# Patient Record
Sex: MALE | Race: WHITE | NOT HISPANIC OR LATINO | Employment: FULL TIME | URBAN - METROPOLITAN AREA
[De-identification: names, ages, dates, MRNs, and addresses within clinical notes are randomized per-mention and may not be internally consistent; named-entity substitution may affect disease eponyms.]

---

## 2022-05-10 ENCOUNTER — LAB (OUTPATIENT)
Dept: LAB | Facility: HOSPITAL | Age: 32
End: 2022-05-10

## 2022-05-10 ENCOUNTER — OFFICE VISIT (OUTPATIENT)
Dept: FAMILY MEDICINE CLINIC | Age: 32
End: 2022-05-10

## 2022-05-10 VITALS
SYSTOLIC BLOOD PRESSURE: 135 MMHG | BODY MASS INDEX: 20.84 KG/M2 | WEIGHT: 145.6 LBS | TEMPERATURE: 98 F | HEIGHT: 70 IN | DIASTOLIC BLOOD PRESSURE: 79 MMHG | HEART RATE: 75 BPM

## 2022-05-10 DIAGNOSIS — Z00.00 ROUTINE ADULT HEALTH MAINTENANCE: ICD-10-CM

## 2022-05-10 DIAGNOSIS — Z13.29 SCREENING FOR THYROID DISORDER: ICD-10-CM

## 2022-05-10 DIAGNOSIS — Z11.59 NEED FOR HEPATITIS C SCREENING TEST: ICD-10-CM

## 2022-05-10 DIAGNOSIS — Z23 NEED FOR VACCINATION: Primary | ICD-10-CM

## 2022-05-10 DIAGNOSIS — Z13.220 SCREENING FOR LIPID DISORDERS: ICD-10-CM

## 2022-05-10 DIAGNOSIS — Z00.00 ANNUAL PHYSICAL EXAM: ICD-10-CM

## 2022-05-10 LAB
ALBUMIN SERPL-MCNC: 4.7 G/DL (ref 3.5–5.2)
ALBUMIN/GLOB SERPL: 1.8 G/DL
ALP SERPL-CCNC: 75 U/L (ref 39–117)
ALT SERPL W P-5'-P-CCNC: 20 U/L (ref 1–41)
ANION GAP SERPL CALCULATED.3IONS-SCNC: 12.9 MMOL/L (ref 5–15)
AST SERPL-CCNC: 22 U/L (ref 1–40)
BASOPHILS # BLD AUTO: 0.04 10*3/MM3 (ref 0–0.2)
BASOPHILS NFR BLD AUTO: 0.6 % (ref 0–1.5)
BILIRUB SERPL-MCNC: 0.7 MG/DL (ref 0–1.2)
BUN SERPL-MCNC: 16 MG/DL (ref 6–20)
BUN/CREAT SERPL: 14.3 (ref 7–25)
CALCIUM SPEC-SCNC: 9.9 MG/DL (ref 8.6–10.5)
CHLORIDE SERPL-SCNC: 104 MMOL/L (ref 98–107)
CHOLEST SERPL-MCNC: 212 MG/DL (ref 0–200)
CO2 SERPL-SCNC: 22.1 MMOL/L (ref 22–29)
CREAT SERPL-MCNC: 1.12 MG/DL (ref 0.76–1.27)
DEPRECATED RDW RBC AUTO: 40.4 FL (ref 37–54)
EGFRCR SERPLBLD CKD-EPI 2021: 90.1 ML/MIN/1.73
EOSINOPHIL # BLD AUTO: 0.3 10*3/MM3 (ref 0–0.4)
EOSINOPHIL NFR BLD AUTO: 4.6 % (ref 0.3–6.2)
ERYTHROCYTE [DISTWIDTH] IN BLOOD BY AUTOMATED COUNT: 12.4 % (ref 12.3–15.4)
GLOBULIN UR ELPH-MCNC: 2.6 GM/DL
GLUCOSE SERPL-MCNC: 82 MG/DL (ref 65–99)
HCT VFR BLD AUTO: 43.8 % (ref 37.5–51)
HCV AB SER DONR QL: NORMAL
HDLC SERPL-MCNC: 42 MG/DL (ref 40–60)
HGB BLD-MCNC: 14.3 G/DL (ref 13–17.7)
IMM GRANULOCYTES # BLD AUTO: 0.01 10*3/MM3 (ref 0–0.05)
IMM GRANULOCYTES NFR BLD AUTO: 0.2 % (ref 0–0.5)
LDLC SERPL CALC-MCNC: 151 MG/DL (ref 0–100)
LDLC/HDLC SERPL: 3.54 {RATIO}
LYMPHOCYTES # BLD AUTO: 2.84 10*3/MM3 (ref 0.7–3.1)
LYMPHOCYTES NFR BLD AUTO: 43.6 % (ref 19.6–45.3)
MCH RBC QN AUTO: 29.1 PG (ref 26.6–33)
MCHC RBC AUTO-ENTMCNC: 32.6 G/DL (ref 31.5–35.7)
MCV RBC AUTO: 89 FL (ref 79–97)
MONOCYTES # BLD AUTO: 0.36 10*3/MM3 (ref 0.1–0.9)
MONOCYTES NFR BLD AUTO: 5.5 % (ref 5–12)
NEUTROPHILS NFR BLD AUTO: 2.97 10*3/MM3 (ref 1.7–7)
NEUTROPHILS NFR BLD AUTO: 45.5 % (ref 42.7–76)
PLATELET # BLD AUTO: 235 10*3/MM3 (ref 140–450)
PMV BLD AUTO: 8.8 FL (ref 6–12)
POTASSIUM SERPL-SCNC: 4 MMOL/L (ref 3.5–5.2)
PROT SERPL-MCNC: 7.3 G/DL (ref 6–8.5)
RBC # BLD AUTO: 4.92 10*6/MM3 (ref 4.14–5.8)
SODIUM SERPL-SCNC: 139 MMOL/L (ref 136–145)
TRIGL SERPL-MCNC: 107 MG/DL (ref 0–150)
TSH SERPL DL<=0.05 MIU/L-ACNC: 1.53 UIU/ML (ref 0.27–4.2)
VLDLC SERPL-MCNC: 19 MG/DL (ref 5–40)
WBC NRBC COR # BLD: 6.52 10*3/MM3 (ref 3.4–10.8)

## 2022-05-10 PROCEDURE — 36415 COLL VENOUS BLD VENIPUNCTURE: CPT

## 2022-05-10 PROCEDURE — 90471 IMMUNIZATION ADMIN: CPT | Performed by: NURSE PRACTITIONER

## 2022-05-10 PROCEDURE — 80050 GENERAL HEALTH PANEL: CPT

## 2022-05-10 PROCEDURE — 90715 TDAP VACCINE 7 YRS/> IM: CPT | Performed by: NURSE PRACTITIONER

## 2022-05-10 PROCEDURE — 80061 LIPID PANEL: CPT

## 2022-05-10 PROCEDURE — 99385 PREV VISIT NEW AGE 18-39: CPT | Performed by: NURSE PRACTITIONER

## 2022-05-10 PROCEDURE — 86803 HEPATITIS C AB TEST: CPT

## 2022-05-10 NOTE — PROGRESS NOTES
"Yves Cordova presents to [facility] with complaint of  Establish Care and Annual Exam    SUBJECTIVE  History of Present Illness     The patient presents today to establish relations. 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 He recently got health and dental insurance. He did not have prior PCP.     Only PMH includes anxiety. PSH includes hernia repair. He is not on any medications. His grandmother is diabetic.     He is interested in screening labs.     He is fully vaccinated for covid. He does not get annual flu vaccine.     He does not have any issues or concerns he wishes to address today.    OBJECTIVE  Vital Signs:   /79 (BP Location: Right arm, Patient Position: Sitting)   Pulse 75   Temp 98 °F (36.7 °C) (Oral)   Ht 177.8 cm (70\")   Wt 66 kg (145 lb 9.6 oz)   BMI 20.89 kg/m²       Physical Exam  Constitutional:       General: He is not in acute distress.     Appearance: Normal appearance. He is not ill-appearing.   HENT:      Head: Normocephalic and atraumatic.      Right Ear: Tympanic membrane and ear canal normal.      Left Ear: Tympanic membrane and ear canal normal.      Nose: Nose normal.      Mouth/Throat:      Mouth: Mucous membranes are moist.      Dentition: Dental caries present.   Neck:      Thyroid: No thyroid mass, thyromegaly or thyroid tenderness.   Cardiovascular:      Rate and Rhythm: Normal rate and regular rhythm.      Pulses: Normal pulses.      Heart sounds: Normal heart sounds.   Pulmonary:      Effort: Pulmonary effort is normal. No respiratory distress.      Breath sounds: Normal breath sounds.   Chest:      Chest wall: No tenderness.   Musculoskeletal:         General: Normal range of motion.      Cervical back: Normal range of motion and neck supple.   Lymphadenopathy:      Cervical: No cervical adenopathy.   Skin:     General: Skin is warm and dry.      Capillary Refill: Capillary refill takes less than 2 seconds.   Neurological:      General: " No focal deficit present.      Mental Status: He is alert and oriented to person, place, and time. Mental status is at baseline.   Psychiatric:         Mood and Affect: Mood normal.         Behavior: Behavior normal.              ASSESSMENT AND PLAN:  Diagnoses and all orders for this visit:    1. Need for vaccination (Primary)  -     Tdap Vaccine Greater Than or Equal To 6yo IM    2. Screening for thyroid disorder  -     TSH; Future    3. Screening for lipid disorders  -     Lipid Panel; Future    4. Annual physical exam  Comments:  -getting Tdap today, UTD on Covid, will start seeing dentist regularly     5. Routine adult health maintenance  -     CBC & Differential; Future  -     Comprehensive Metabolic Panel; Future    6. Need for hepatitis C screening test  -     Hepatitis C antibody; Future        Follow Up   Return in about 1 year (around 5/10/2023). Patient to notify office with any acute concerns or issues.  Patient verbalizes understanding, agrees with plan of care and has no further questions upon discharge.     Patient was given instructions and counseling regarding his condition or for health maintenance advice. Please see specific information pulled into the AVS if appropriate.

## 2022-12-05 ENCOUNTER — TELEMEDICINE (OUTPATIENT)
Dept: FAMILY MEDICINE CLINIC | Age: 32
End: 2022-12-05

## 2022-12-05 VITALS — HEIGHT: 70 IN | BODY MASS INDEX: 20.89 KG/M2

## 2022-12-05 DIAGNOSIS — R11.0 NAUSEA: ICD-10-CM

## 2022-12-05 DIAGNOSIS — U07.1 COVID: Primary | ICD-10-CM

## 2022-12-05 DIAGNOSIS — R05.1 ACUTE COUGH: ICD-10-CM

## 2022-12-05 PROCEDURE — 99213 OFFICE O/P EST LOW 20 MIN: CPT | Performed by: NURSE PRACTITIONER

## 2022-12-05 RX ORDER — GUAIFENESIN 600 MG/1
1200 TABLET, EXTENDED RELEASE ORAL 2 TIMES DAILY
Qty: 40 TABLET | Refills: 0 | Status: SHIPPED | OUTPATIENT
Start: 2022-12-05

## 2022-12-05 RX ORDER — ONDANSETRON 4 MG/1
4 TABLET, ORALLY DISINTEGRATING ORAL EVERY 8 HOURS PRN
Qty: 15 TABLET | Refills: 0 | Status: SHIPPED | OUTPATIENT
Start: 2022-12-05

## 2022-12-05 RX ORDER — DEXTROMETHORPHAN HYDROBROMIDE AND PROMETHAZINE HYDROCHLORIDE 15; 6.25 MG/5ML; MG/5ML
5 SYRUP ORAL NIGHTLY PRN
Qty: 118 ML | Refills: 0 | Status: SHIPPED | OUTPATIENT
Start: 2022-12-05

## 2022-12-05 NOTE — PROGRESS NOTES
Chief Complaint  Covid-19 Home Monitoring Video Visit ([phone] ), Cough (Sx started last friday), Diarrhea, Headache, Vomiting, and Generalized Body Aches    Subjective        Yves Cordova presents to [facility]  Cough  This is a new problem. The current episode started in the past 7 days. The cough is non-productive. Associated symptoms include headaches, nasal congestion and postnasal drip. Pertinent negatives include no chest pain, chills, fever or shortness of breath. He has tried OTC cough suppressant for the symptoms.   Patient here today for concerns of possible covid infection or URI     Known Exposure to positive case?   na  Date of exposure?   na  Date of symptoms start?   12/2/2022  (Symptoms may appear 2-14 days after exposure )    Fever or chills?   yes  Cough?   yes  Shortness of breath or difficulty breathing?  no  Fatigue?   yes  Muscle or body aches?  yes   Headache?   yes  New loss of taste or smell? no  Sore throat?  yes  Congestion or runny nose? yes  Nausea or vomiting?   yes  Diarrhea?   yes  Any  emergency warning signs for COVID-19.   Trouble breathing?  no  Persistent pain or pressure in the chest?   no  New confusion? no  Inability to wake or stay awake?no  Pale, gray, or blue-colored skin, lips, or nail beds, depending on skin tone?   no    Taking any medications at home to help with symptoms?yes  Any prior vaccine to covid?   yes  Any significant health problems / existing lung / heart problems?  no  Interested in monoclonal antibody treatment if test result is positive? no  (must be symptomatic, have a positive PCR covid test and meet ONE of the following criteria and be at least 18 YOA)  Age 65 or older  Obesity (BMI of 25 or more)  Pregnancy  Immunosuppressed  Diabetes  CKD  Cardiovascular disease, hypertension, chronic lung disease  Sickle cell disease   Neurodevelopment disorder    Patient consent to video visit.  Patient identity  "confirmed.  [employer] video call was used.  Provider in office at desktop  Patient at home.      Objective   Vital Signs:  Ht 177.8 cm (70\")   BMI 20.89 kg/m²   Estimated body mass index is 20.89 kg/m² as calculated from the following:    Height as of this encounter: 177.8 cm (70\").    Weight as of 5/10/22: 66 kg (145 lb 9.6 oz).    BMI is within normal parameters. No other follow-up for BMI required.      Physical Exam  HENT:      Head: Normocephalic.   Pulmonary:      Effort: No respiratory distress.   Neurological:      Mental Status: He is alert and oriented to person, place, and time.   Psychiatric:         Mood and Affect: Mood normal.        Result Review :                Assessment and Plan   Diagnoses and all orders for this visit:    1. COVID (Primary)  Comments:  Improving. Force fluids.     2. Acute cough  -     promethazine-dextromethorphan (PROMETHAZINE-DM) 6.25-15 MG/5ML syrup; Take 5 mL by mouth At Night As Needed for Cough.  Dispense: 118 mL; Refill: 0  -     guaiFENesin (Mucinex) 600 MG 12 hr tablet; Take 2 tablets by mouth 2 (Two) Times a Day.  Dispense: 40 tablet; Refill: 0    3. Nausea  -     ondansetron ODT (ZOFRAN-ODT) 4 MG disintegrating tablet; Place 1 tablet on the tongue Every 8 (Eight) Hours As Needed for Nausea or Vomiting.  Dispense: 15 tablet; Refill: 0             Follow Up   Return if symptoms worsen or fail to improve.  Patient was given instructions and counseling regarding his condition or for health maintenance advice. Please see specific information pulled into the AVS if appropriate.       "

## 2024-01-17 ENCOUNTER — LAB (OUTPATIENT)
Dept: LAB | Facility: HOSPITAL | Age: 34
End: 2024-01-17
Payer: COMMERCIAL

## 2024-01-17 ENCOUNTER — OFFICE VISIT (OUTPATIENT)
Dept: FAMILY MEDICINE CLINIC | Age: 34
End: 2024-01-17
Payer: COMMERCIAL

## 2024-01-17 VITALS
HEART RATE: 87 BPM | OXYGEN SATURATION: 98 % | DIASTOLIC BLOOD PRESSURE: 97 MMHG | HEIGHT: 70 IN | WEIGHT: 259.2 LBS | BODY MASS INDEX: 37.11 KG/M2 | SYSTOLIC BLOOD PRESSURE: 153 MMHG | TEMPERATURE: 98.2 F

## 2024-01-17 DIAGNOSIS — R11.0 NAUSEA: Primary | ICD-10-CM

## 2024-01-17 DIAGNOSIS — K29.00 ACUTE GASTRITIS WITHOUT HEMORRHAGE, UNSPECIFIED GASTRITIS TYPE: ICD-10-CM

## 2024-01-17 DIAGNOSIS — R11.0 NAUSEA: ICD-10-CM

## 2024-01-17 DIAGNOSIS — A04.8 H. PYLORI INFECTION: ICD-10-CM

## 2024-01-17 PROCEDURE — 99213 OFFICE O/P EST LOW 20 MIN: CPT | Performed by: NURSE PRACTITIONER

## 2024-01-17 PROCEDURE — 83013 H PYLORI (C-13) BREATH: CPT

## 2024-01-17 RX ORDER — OMEPRAZOLE 40 MG/1
40 CAPSULE, DELAYED RELEASE ORAL DAILY
Qty: 14 CAPSULE | Refills: 0 | Status: SHIPPED | OUTPATIENT
Start: 2024-01-17 | End: 2024-01-22 | Stop reason: SDUPTHER

## 2024-01-17 RX ORDER — ONDANSETRON 4 MG/1
4 TABLET, ORALLY DISINTEGRATING ORAL EVERY 8 HOURS PRN
Qty: 30 TABLET | Refills: 0 | Status: SHIPPED | OUTPATIENT
Start: 2024-01-17

## 2024-01-17 RX ORDER — SUCRALFATE 1 G/1
1 TABLET ORAL 3 TIMES DAILY
Qty: 40 TABLET | Refills: 1 | Status: SHIPPED | OUTPATIENT
Start: 2024-01-17

## 2024-01-17 NOTE — PROGRESS NOTES
"Chief Complaint  Nausea (Sx started 12-30), Diarrhea (After eating - yellow ), Vomiting, and Abdominal Pain    Subjective    Patient is in today with complaints of continued nausea.  Patient reports that he had the stomach virus on New Year's, and since that time he has been feeling nauseated in the morning, and nausea after eating. Stool is yellow, normal consistency.  Reports reflux since childhood with no recent increase. Denies abdominal pain, fever, chills, or diarrhea at this time.         Yves Cordova presents to [facility]  History of Present Illness    Objective   Vital Signs:  /97 (BP Location: Left arm, Patient Position: Sitting, Cuff Size: Adult)   Pulse 87   Temp 98.2 °F (36.8 °C) (Temporal)   Ht 177.8 cm (70\")   Wt 118 kg (259 lb 3.2 oz)   SpO2 98% Comment: room air  BMI 37.19 kg/m²   Estimated body mass index is 37.19 kg/m² as calculated from the following:    Height as of this encounter: 177.8 cm (70\").    Weight as of this encounter: 118 kg (259 lb 3.2 oz).             Physical Exam  HENT:      Head: Normocephalic.   Cardiovascular:      Rate and Rhythm: Normal rate and regular rhythm.   Pulmonary:      Effort: Pulmonary effort is normal. No respiratory distress.      Breath sounds: Normal breath sounds. No stridor. No wheezing, rhonchi or rales.   Abdominal:      General: Bowel sounds are normal. There is no distension.      Palpations: Abdomen is soft.      Tenderness: There is no abdominal tenderness. There is no guarding.   Skin:     General: Skin is warm and dry.   Neurological:      Mental Status: He is alert and oriented to person, place, and time.   Psychiatric:         Mood and Affect: Mood normal.        Result Review :                     Assessment and Plan     Diagnoses and all orders for this visit:    1. Nausea (Primary)  -     US Gallbladder; Future  -     H. Pylori Breath Test - Breath, Lung; Future  -     ondansetron ODT (ZOFRAN-ODT) " 4 MG disintegrating tablet; Place 1 tablet on the tongue Every 8 (Eight) Hours As Needed for Nausea or Vomiting.  Dispense: 30 tablet; Refill: 0    2. Acute gastritis without hemorrhage, unspecified gastritis type  -     sucralfate (Carafate) 1 g tablet; Take 1 tablet by mouth 3 (Three) Times a Day.  Dispense: 40 tablet; Refill: 1  -     omeprazole (priLOSEC) 40 MG capsule; Take 1 capsule by mouth Daily.  Dispense: 14 capsule; Refill: 0    [place] , low fat diet until test performed. Will treat with PPI and Carafate for possible gastritis/irritation of lining of stomach. Zofran for PRN use. ER if unable to hold fluids down for 48 hours.          Follow Up     Return if symptoms worsen or fail to improve.  Patient was given instructions and counseling regarding his condition or for health maintenance advice. Please see specific information pulled into the AVS if appropriate.

## 2024-01-20 LAB — UREA BREATH TEST QL: POSITIVE

## 2024-01-22 RX ORDER — OMEPRAZOLE 20 MG/1
20 CAPSULE, DELAYED RELEASE ORAL 2 TIMES DAILY
Qty: 28 CAPSULE | Refills: 0 | Status: SHIPPED | OUTPATIENT
Start: 2024-01-22 | End: 2024-02-05

## 2024-01-22 RX ORDER — CLARITHROMYCIN 500 MG/1
500 TABLET, COATED ORAL 2 TIMES DAILY
Qty: 28 TABLET | Refills: 0 | Status: SHIPPED | OUTPATIENT
Start: 2024-01-22 | End: 2024-02-05

## 2024-01-22 RX ORDER — METRONIDAZOLE 500 MG/1
500 TABLET ORAL 3 TIMES DAILY
Qty: 42 TABLET | Refills: 0 | Status: SHIPPED | OUTPATIENT
Start: 2024-01-22 | End: 2024-02-05

## 2024-01-23 ENCOUNTER — HOSPITAL ENCOUNTER (OUTPATIENT)
Dept: ULTRASOUND IMAGING | Facility: HOSPITAL | Age: 34
Discharge: HOME | End: 2024-01-23
Admitting: NURSE PRACTITIONER
Payer: COMMERCIAL

## 2024-01-23 DIAGNOSIS — R11.0 NAUSEA: ICD-10-CM

## 2024-01-23 PROCEDURE — 76705 ECHO EXAM OF ABDOMEN: CPT

## 2024-06-05 ENCOUNTER — OFFICE VISIT (OUTPATIENT)
Dept: FAMILY MEDICINE CLINIC | Age: 34
End: 2024-06-05
Payer: COMMERCIAL

## 2024-06-05 VITALS
HEART RATE: 66 BPM | HEIGHT: 70 IN | TEMPERATURE: 97.8 F | WEIGHT: 262.2 LBS | DIASTOLIC BLOOD PRESSURE: 82 MMHG | BODY MASS INDEX: 37.54 KG/M2 | SYSTOLIC BLOOD PRESSURE: 118 MMHG | OXYGEN SATURATION: 97 %

## 2024-06-05 DIAGNOSIS — K21.9 GASTROESOPHAGEAL REFLUX DISEASE, UNSPECIFIED WHETHER ESOPHAGITIS PRESENT: Primary | ICD-10-CM

## 2024-06-05 PROBLEM — Z86.19 HISTORY OF HELICOBACTER PYLORI INFECTION: Status: ACTIVE | Noted: 2024-06-05

## 2024-06-05 PROCEDURE — 99214 OFFICE O/P EST MOD 30 MIN: CPT | Performed by: NURSE PRACTITIONER

## 2024-06-05 RX ORDER — FAMOTIDINE 40 MG/1
40 TABLET, FILM COATED ORAL DAILY
Qty: 90 TABLET | Refills: 0 | Status: SHIPPED | OUTPATIENT
Start: 2024-06-05

## 2024-06-05 NOTE — ASSESSMENT & PLAN NOTE
Will start daily famotidine. Continue with lifestyle modifications. Discussed consideration for possible scope if symptoms persist or worsen. Work note provided to patient.

## 2024-06-05 NOTE — PROGRESS NOTES
Chief Complaint  Yves Cordova presents to [facility] for Heartburn (Acid reflux getting worse)    Subjective          History of Present Illness    Yves is here today with c/o acid reflux. He reports that he has had symptoms since he was around age 13. H pylori positive 1/17/24. Treatment completed and other symptoms improved. He has had increased burning in esophageal region remaining. He is taking Tums on occasion. He has changed his diet and avoids eating close to bedtime. He called in to work this morning after throwing up. Needs work note.    Review of Systems      Allergies   Allergen Reactions    Penicillins Hives      Past Medical History:   Diagnosis Date    Anxiety      Current Outpatient Medications   Medication Sig Dispense Refill    famotidine (Pepcid) 40 MG tablet Take 1 tablet by mouth Daily. 90 tablet 0     No current facility-administered medications for this visit.     Past Surgical History:   Procedure Laterality Date    HERNIA REPAIR        Social History     Tobacco Use    Smoking status: Former     Current packs/day: 0.00     Average packs/day: 1 pack/day for 12.0 years (12.0 ttl pk-yrs)     Types: Cigarettes     Start date: 5/10/2009     Quit date: 5/10/2021     Years since quitting: 3.0     Passive exposure: Past    Smokeless tobacco: Never   Vaping Use    Vaping status: Never Used   Substance Use Topics    Alcohol use: Never    Drug use: Never     Family History   Problem Relation Age of Onset    Diabetes Maternal Grandmother      Health Maintenance Due   Topic Date Due    ANNUAL PHYSICAL  05/10/2023      Immunization History   Administered Date(s) Administered    COVID-19 (MODERNA) 1st,2nd,3rd Dose Monovalent 04/13/2021, 05/11/2021, 12/13/2021    Hep B, Adolescent or Pediatric 11/12/2002, 12/12/2002, 03/09/2004    MMR 11/12/2002    Td (TDVAX) 03/09/2004    Tdap 05/10/2022        Objective     Vitals:    06/05/24 1027   BP: 118/82   BP Location: Left arm  "  Patient Position: Sitting   Cuff Size: Large Adult   Pulse: 66   Temp: 97.8 °F (36.6 °C)   TempSrc: Oral   SpO2: 97%   Weight: 119 kg (262 lb 3.2 oz)   Height: 177.8 cm (70\")     Body mass index is 37.62 kg/m².   Class 2 Severe Obesity (BMI >=35 and <=39.9). Obesity-related health conditions include the following: GERD. Obesity is unchanged. BMI is is above average; BMI management plan is completed. We discussed Information on healthy weight added to patient's after visit summary.            No results found.    Physical Exam  Vitals reviewed.   Constitutional:       General: He is not in acute distress.     Appearance: Normal appearance. He is well-developed.   HENT:      Head: Normocephalic and atraumatic.   Cardiovascular:      Rate and Rhythm: Normal rate.   Pulmonary:      Effort: Pulmonary effort is normal.   Neurological:      Mental Status: He is alert and oriented to person, place, and time.   Psychiatric:         Mood and Affect: Mood and affect normal.           Result Review :                               Assessment and Plan      Assessment & Plan  Gastroesophageal reflux disease, unspecified whether esophagitis present  Will start daily famotidine. Continue with lifestyle modifications. Discussed consideration for possible scope if symptoms persist or worsen. Work note provided to patient.      New Medications Ordered This Visit   Medications    famotidine (Pepcid) 40 MG tablet     Sig: Take 1 tablet by mouth Daily.     Dispense:  90 tablet     Refill:  0                 Follow Up     No follow-ups on file.             "

## 2024-06-05 NOTE — LETTER
June 5, 2024     Patient: Yves Cordova   YOB: 1990   Date of Visit: 6/5/2024       To Whom It May Concern:    It is my medical opinion that Yves Cordova  be excused from work on Wednesday June 5, 2024 .           Sincerely,        HAROON Ramos    CC: No Recipients

## 2024-07-23 ENCOUNTER — LAB (OUTPATIENT)
Dept: LAB | Facility: HOSPITAL | Age: 34
End: 2024-07-23
Payer: COMMERCIAL

## 2024-07-23 ENCOUNTER — OFFICE VISIT (OUTPATIENT)
Dept: FAMILY MEDICINE CLINIC | Age: 34
End: 2024-07-23
Payer: COMMERCIAL

## 2024-07-23 VITALS
OXYGEN SATURATION: 96 % | TEMPERATURE: 98.1 F | BODY MASS INDEX: 36.36 KG/M2 | SYSTOLIC BLOOD PRESSURE: 141 MMHG | WEIGHT: 254 LBS | HEIGHT: 70 IN | DIASTOLIC BLOOD PRESSURE: 95 MMHG | HEART RATE: 59 BPM

## 2024-07-23 DIAGNOSIS — R10.84 GENERALIZED ABDOMINAL PAIN: ICD-10-CM

## 2024-07-23 DIAGNOSIS — A04.8 H. PYLORI INFECTION: ICD-10-CM

## 2024-07-23 DIAGNOSIS — Z13.29 SCREENING FOR THYROID DISORDER: ICD-10-CM

## 2024-07-23 DIAGNOSIS — K21.9 GASTROESOPHAGEAL REFLUX DISEASE, UNSPECIFIED WHETHER ESOPHAGITIS PRESENT: ICD-10-CM

## 2024-07-23 DIAGNOSIS — R14.2 BELCHING: Primary | ICD-10-CM

## 2024-07-23 DIAGNOSIS — R14.2 BELCHING: ICD-10-CM

## 2024-07-23 LAB
ALBUMIN SERPL-MCNC: 4.6 G/DL (ref 3.5–5.2)
ALBUMIN/GLOB SERPL: 1.7 G/DL
ALP SERPL-CCNC: 71 U/L (ref 39–117)
ALT SERPL W P-5'-P-CCNC: 23 U/L (ref 1–41)
ANION GAP SERPL CALCULATED.3IONS-SCNC: 13 MMOL/L (ref 5–15)
AST SERPL-CCNC: 18 U/L (ref 1–40)
BASOPHILS # BLD AUTO: 0.04 10*3/MM3 (ref 0–0.2)
BASOPHILS NFR BLD AUTO: 0.5 % (ref 0–1.5)
BILIRUB SERPL-MCNC: 0.6 MG/DL (ref 0–1.2)
BUN SERPL-MCNC: 14 MG/DL (ref 6–20)
BUN/CREAT SERPL: 11.6 (ref 7–25)
CALCIUM SPEC-SCNC: 10 MG/DL (ref 8.6–10.5)
CHLORIDE SERPL-SCNC: 104 MMOL/L (ref 98–107)
CO2 SERPL-SCNC: 20 MMOL/L (ref 22–29)
CREAT SERPL-MCNC: 1.21 MG/DL (ref 0.76–1.27)
DEPRECATED RDW RBC AUTO: 40 FL (ref 37–54)
EGFRCR SERPLBLD CKD-EPI 2021: 80.6 ML/MIN/1.73
EOSINOPHIL # BLD AUTO: 0.32 10*3/MM3 (ref 0–0.4)
EOSINOPHIL NFR BLD AUTO: 4.1 % (ref 0.3–6.2)
ERYTHROCYTE [DISTWIDTH] IN BLOOD BY AUTOMATED COUNT: 12.1 % (ref 12.3–15.4)
GLOBULIN UR ELPH-MCNC: 2.7 GM/DL
GLUCOSE SERPL-MCNC: 96 MG/DL (ref 65–99)
HCT VFR BLD AUTO: 50.1 % (ref 37.5–51)
HGB BLD-MCNC: 16.6 G/DL (ref 13–17.7)
IMM GRANULOCYTES # BLD AUTO: 0.03 10*3/MM3 (ref 0–0.05)
IMM GRANULOCYTES NFR BLD AUTO: 0.4 % (ref 0–0.5)
LYMPHOCYTES # BLD AUTO: 3.1 10*3/MM3 (ref 0.7–3.1)
LYMPHOCYTES NFR BLD AUTO: 40 % (ref 19.6–45.3)
MCH RBC QN AUTO: 29.4 PG (ref 26.6–33)
MCHC RBC AUTO-ENTMCNC: 33.1 G/DL (ref 31.5–35.7)
MCV RBC AUTO: 88.8 FL (ref 79–97)
MONOCYTES # BLD AUTO: 0.42 10*3/MM3 (ref 0.1–0.9)
MONOCYTES NFR BLD AUTO: 5.4 % (ref 5–12)
NEUTROPHILS NFR BLD AUTO: 3.84 10*3/MM3 (ref 1.7–7)
NEUTROPHILS NFR BLD AUTO: 49.6 % (ref 42.7–76)
PLATELET # BLD AUTO: 279 10*3/MM3 (ref 140–450)
PMV BLD AUTO: 9.1 FL (ref 6–12)
POTASSIUM SERPL-SCNC: 4 MMOL/L (ref 3.5–5.2)
PROT SERPL-MCNC: 7.3 G/DL (ref 6–8.5)
RBC # BLD AUTO: 5.64 10*6/MM3 (ref 4.14–5.8)
SODIUM SERPL-SCNC: 137 MMOL/L (ref 136–145)
TSH SERPL DL<=0.05 MIU/L-ACNC: 1.32 UIU/ML (ref 0.27–4.2)
WBC NRBC COR # BLD AUTO: 7.75 10*3/MM3 (ref 3.4–10.8)

## 2024-07-23 PROCEDURE — 83013 H PYLORI (C-13) BREATH: CPT

## 2024-07-23 PROCEDURE — 99213 OFFICE O/P EST LOW 20 MIN: CPT | Performed by: NURSE PRACTITIONER

## 2024-07-23 PROCEDURE — 80050 GENERAL HEALTH PANEL: CPT

## 2024-07-23 PROCEDURE — 36415 COLL VENOUS BLD VENIPUNCTURE: CPT

## 2024-07-23 NOTE — LETTER
July 23, 2024     Patient: Yves Cordova   YOB: 1990   Date of Visit: 7/23/2024       To Whom It May Concern:    Yves Cordova  was seen in office today 7/23/24 .           Sincerely,        HAORON Renner

## 2024-07-23 NOTE — Clinical Note
Can we call [facility] and have them cancel the bismuth that is bid? It should be the qid, sorry about this!

## 2024-07-23 NOTE — PROGRESS NOTES
"Yves Cordova presents to [facility] with complaint of  GI Problem (Bile in bowel movement, nausea, waking up in the middle of the night with sweating and feeling hot. Pt states he had these same sx when he had H.pylori in January )    SUBJECTIVE  GI Problem  The primary symptoms include abdominal pain (generalized, achy), nausea, vomiting (wakes up in AM vomting bile almost every AM) and diarrhea (3 episodes per week). Primary symptoms do not include fever, weight loss, fatigue, melena, hematemesis, jaundice, hematochezia, dysuria, myalgias, arthralgias or rash. The illness began more than 7 days ago. The onset was gradual. The problem has not changed since onset.  The illness does not include chills, dysphagia, bloating, constipation or back pain. Significant associated medical issues include GERD. Associated medical issues comments: treated for h pylori 6 months ago, did not have retest to confirm resolution.     Saw Meghana Nettles here last month, started on pepcid 40 mg has seen some improvement.     OBJECTIVE  Vital Signs:   /95 (BP Location: Right arm, Patient Position: Sitting)   Pulse 59   Temp 98.1 °F (36.7 °C) (Oral)   Ht 177.8 cm (70\")   Wt 115 kg (254 lb)   SpO2 96% Comment: room air  BMI 36.45 kg/m²       Physical Exam  Constitutional:       General: He is not in acute distress.     Appearance: Normal appearance. He is not ill-appearing.   HENT:      Head: Normocephalic and atraumatic.      Nose: Nose normal.      Mouth/Throat:      Pharynx: Oropharynx is clear.   Cardiovascular:      Rate and Rhythm: Normal rate and regular rhythm.      Pulses: Normal pulses.      Heart sounds: Normal heart sounds.   Pulmonary:      Effort: Pulmonary effort is normal. No respiratory distress.      Breath sounds: Normal breath sounds.   Chest:      Chest wall: No tenderness.   Abdominal:      General: Bowel sounds are normal.      Palpations: Abdomen is soft.      " Tenderness: There is no abdominal tenderness.   Musculoskeletal:         General: Normal range of motion.      Cervical back: Normal range of motion and neck supple.   Skin:     General: Skin is warm and dry.   Neurological:      General: No focal deficit present.      Mental Status: He is alert and oriented to person, place, and time. Mental status is at baseline.   Psychiatric:         Mood and Affect: Mood normal.         Behavior: Behavior normal.          Results Review:  The following data was reviewed by HAROON Renner [unfilled] 11:55 EDT.  Office Visit with Meghana Nettles APRN (06/05/2024)   Office Visit with Ria Hameed APRN (01/17/2024)   H. Pylori Breath Test - Breath, Lung (01/17/2024 10:57)   US Gallbladder (01/23/2024 14:10)     ASSESSMENT AND PLAN:  Diagnoses and all orders for this visit:    1. Belching (Primary)  -     H. Pylori Breath Test - Breath, Lung; Future    2. Screening for thyroid disorder  -     TSH Rfx On Abnormal To Free T4; Future    3. Generalized abdominal pain  -     Comprehensive Metabolic Panel; Future  -     CBC & Differential; Future  -     TSH Rfx On Abnormal To Free T4; Future    4. Gastroesophageal reflux disease, unspecified whether esophagitis present      Will test for H. pylori, checking TSH, CMP, CBC.  Considered doing stool studies but he is not having diarrhea regularly.  Further workup and management depending on labs ordered today and patient progression of symptoms.      Follow Up   No follow-ups on file. Patient to notify office with any acute concerns or issues.  Patient verbalizes understanding, agrees with plan of care and has no further questions upon discharge.     Patient was given instructions and counseling regarding his condition or for health maintenance advice. Please see specific information pulled into the AVS if appropriate.     Discussed the importance of following up with any needed screening tests/labs/specialist appointments and any  requested follow-up recommended by me today. Importance of maintaining follow-up discussed and patient accepts that missed appointments can delay diagnosis and potentially lead to worsening of conditions.    Part of this note may be an electronic transcription/translation of spoken language to printed text using the Dragon Dictation System.

## 2024-07-25 LAB — UREA BREATH TEST QL: POSITIVE

## 2024-07-26 ENCOUNTER — TELEPHONE (OUTPATIENT)
Dept: FAMILY MEDICINE CLINIC | Age: 34
End: 2024-07-26
Payer: COMMERCIAL

## 2024-07-26 RX ORDER — METRONIDAZOLE 500 MG/1
500 TABLET ORAL 3 TIMES DAILY
Qty: 42 TABLET | Refills: 0 | Status: SHIPPED | OUTPATIENT
Start: 2024-07-26 | End: 2024-08-09

## 2024-07-26 RX ORDER — TETRACYCLINE HYDROCHLORIDE 500 MG/1
500 CAPSULE ORAL 4 TIMES DAILY
Qty: 56 CAPSULE | Refills: 0 | Status: SHIPPED | OUTPATIENT
Start: 2024-07-26 | End: 2024-08-09

## 2024-07-26 RX ORDER — PANTOPRAZOLE SODIUM 40 MG/1
40 TABLET, DELAYED RELEASE ORAL 2 TIMES DAILY
Qty: 28 TABLET | Refills: 0 | Status: SHIPPED | OUTPATIENT
Start: 2024-07-26 | End: 2024-08-09

## 2024-07-26 NOTE — TELEPHONE ENCOUNTER
----- Message from Carmen Lugo sent at 7/26/2024  8:28 AM EDT -----  Can we call [facility] and have them cancel the bismuth that is bid? It should be the qid, sorry about this!

## 2024-08-01 ENCOUNTER — OFFICE VISIT (OUTPATIENT)
Dept: FAMILY MEDICINE CLINIC | Age: 34
End: 2024-08-01
Payer: COMMERCIAL

## 2024-08-01 ENCOUNTER — TELEPHONE (OUTPATIENT)
Dept: FAMILY MEDICINE CLINIC | Age: 34
End: 2024-08-01

## 2024-08-01 VITALS
BODY MASS INDEX: 35.9 KG/M2 | SYSTOLIC BLOOD PRESSURE: 138 MMHG | WEIGHT: 250.8 LBS | DIASTOLIC BLOOD PRESSURE: 90 MMHG | HEART RATE: 61 BPM | TEMPERATURE: 97.7 F | HEIGHT: 70 IN

## 2024-08-01 DIAGNOSIS — A04.8 H. PYLORI INFECTION: Primary | ICD-10-CM

## 2024-08-01 DIAGNOSIS — R11.14 BILIOUS VOMITING WITH NAUSEA: ICD-10-CM

## 2024-08-01 DIAGNOSIS — Z91.89 AT RISK FOR DEHYDRATION: ICD-10-CM

## 2024-08-01 PROCEDURE — 99213 OFFICE O/P EST LOW 20 MIN: CPT | Performed by: NURSE PRACTITIONER

## 2024-08-01 NOTE — TELEPHONE ENCOUNTER
Patient is requesting STD forms to be completed for the following dates 07/30/2024-08/13/2024. Patient has paid $20 co-pay on 08/01/2024 for his visit with Carmen PATIÑO. Incomplete forms have been scanned in and attached to this encounter and placed forms in [place] mail box to be completed.

## 2024-08-01 NOTE — LETTER
August 1, 2024     Patient: Yves Cordova   YOB: 1990   Date of Visit: 8/1/2024       To Whom It May Concern:    It is my medical opinion that Yves Cordova be excused from work 07/30/2024 thru 08/13/2024. He may return 08/14/2024.    Sincerely,        HAROON Renner    CC: No Recipients

## 2024-08-01 NOTE — PROGRESS NOTES
"Yves Cordova presents to [facility] with complaint of  Medication Problem (He said he need FMLA because the medication is making him vomit. He hasn't ate since Sunday, he can't keep the meds down.)    SUBJECTIVE  History of Present Illness    Patient is here requesting FMLA forms to be filled out.  Patient was seen in the office July 23 and was found to have H. pylori infection.  This is the second time this year he has had H. pylori.  He was started on quadruple therapy.  Patient says that the medication regimen is making him very nauseated.  He has vomited 1 time, only vomited up 1 pill he says.  Patient says that he is vomiting mostly bile.  He is able to keep down very bland foods and Pedialyte and water.  Denies any severe abdominal pain, fever, or significant signs of dehydration. He is present with his mom today.       OBJECTIVE  Vital Signs:   /90   Pulse 61   Temp 97.7 °F (36.5 °C) (Oral)   Ht 177.8 cm (70\")   Wt 114 kg (250 lb 12.8 oz)   BMI 35.99 kg/m²       Physical Exam  Constitutional:       General: He is not in acute distress.     Appearance: Normal appearance. He is not ill-appearing.   HENT:      Head: Normocephalic and atraumatic.      Nose: Nose normal.      Mouth/Throat:      Pharynx: Oropharynx is clear.   Cardiovascular:      Rate and Rhythm: Normal rate and regular rhythm.      Pulses: Normal pulses.      Heart sounds: Normal heart sounds.   Pulmonary:      Effort: Pulmonary effort is normal. No respiratory distress.      Breath sounds: Normal breath sounds.   Chest:      Chest wall: No tenderness.   Musculoskeletal:         General: Normal range of motion.      Cervical back: Normal range of motion and neck supple.   Skin:     General: Skin is warm and dry.   Neurological:      General: No focal deficit present.      Mental Status: He is alert and oriented to person, place, and time. Mental status is at baseline.   Psychiatric:         Mood and " Affect: Mood normal.         Behavior: Behavior normal.              ASSESSMENT AND PLAN:  Diagnoses and all orders for this visit:    1. H. pylori infection (Primary)    2. At risk for dehydration    3. Bilious vomiting with nausea      Patient may have FMLA for the duration of his H. pylori quadruple treatment which is 2 weeks total.  Work note was provided we will fill out FMLA for him.  Discussed severe signs and symptoms of dehydration and that he should proceed to the ER should these occur.  Discussed importance of completing medication regimen to hopefully eradicate H. pylori infection.  He has lab order to have H. pylori retest 6 weeks after treatment, he understands that he should not use any PPI 1 week before treatment.  If it retest he is still positive, would recommend referral to infectious disease and /or GI for EGD.      Follow Up   No follow-ups on file. Patient to notify office with any acute concerns or issues.  Patient verbalizes understanding, agrees with plan of care and has no further questions upon discharge.     Patient was given instructions and counseling regarding his condition or for health maintenance advice. Please see specific information pulled into the AVS if appropriate.     Discussed the importance of following up with any needed screening tests/labs/specialist appointments and any requested follow-up recommended by me today. Importance of maintaining follow-up discussed and patient accepts that missed appointments can delay diagnosis and potentially lead to worsening of conditions.    Part of this note may be an electronic transcription/translation of spoken language to printed text using the Dragon Dictation System.

## 2024-08-12 ENCOUNTER — TELEPHONE (OUTPATIENT)
Dept: FAMILY MEDICINE CLINIC | Age: 34
End: 2024-08-12
Payer: COMMERCIAL

## 2024-08-12 NOTE — TELEPHONE ENCOUNTER
Caller: Yves Cordova    Relationship: Self    Best call back number: [phone]    What form or medical record are you requesting: [employer] FOR [employer] THAT WAS FAXED TO OFFICE TO BE FILLED OUT AND SENT BACK TO HR DEPARTMENT     Who is requesting this form or medical record from you: PATIENT'S HR DEPARTMENT     How would you like to receive the form or medical records (pick-up, mail, fax): FAX  If fax, what is the fax number: [phone]  ATTN: PABLO     Timeframe paperwork needed: TODAY, 08/12/2024    Additional notes: PATIENT'S HR FAXED OVER FITNESS FOR DUTY PAPER THAT NEEDS TO BE FILLED OUT TODAY, 08/12/2024, IN ORDER FOR PATIENT TO RETURN TO WORK WEDNESDAY, 08/14/2024. PATIENT WOULD LIKE CALL TO LET HIM KNOW WHEN THIS HAS BEEN COMPLETED.

## 2024-09-04 ENCOUNTER — LAB (OUTPATIENT)
Dept: LAB | Facility: HOSPITAL | Age: 34
End: 2024-09-04
Payer: COMMERCIAL

## 2024-09-04 DIAGNOSIS — A04.8 H. PYLORI INFECTION: ICD-10-CM

## 2024-09-04 PROCEDURE — 83013 H PYLORI (C-13) BREATH: CPT

## 2024-09-05 LAB — UREA BREATH TEST QL: NEGATIVE

## 2024-09-11 ENCOUNTER — TELEMEDICINE (OUTPATIENT)
Dept: FAMILY MEDICINE CLINIC | Facility: TELEHEALTH | Age: 34
End: 2024-09-11
Payer: COMMERCIAL

## 2024-09-11 DIAGNOSIS — K52.9 GASTROENTERITIS: Primary | ICD-10-CM

## 2024-09-11 NOTE — PROGRESS NOTES
You have chosen to receive care through a telehealth visit.  Do you consent to use a video/audio connection for your medical care today? Yes     CHIEF COMPLAINT  No chief complaint on file.        HPI  Yves Cordova is a 34 y.o. male  presents with complaint of had nausea last night, woke up this morning with vomiting and diarrhea.  Denies fever, abdominal pain.  Has zofran at home.     Review of Systems  See HPI    Past Medical History:   Diagnosis Date    Anxiety        Family History   Problem Relation Age of Onset    Diabetes Maternal Grandmother        Social History     Socioeconomic History    Marital status: Single   Tobacco Use    Smoking status: Former     Current packs/day: 0.00     Average packs/day: 1 pack/day for 12.0 years (12.0 ttl pk-yrs)     Types: Cigarettes     Start date: 5/10/2009     Quit date: 5/10/2021     Years since quitting: 3.3     Passive exposure: Past    Smokeless tobacco: Never   Vaping Use    Vaping status: Never Used   Substance and Sexual Activity    Alcohol use: Never    Drug use: Never       Yves Cordova  reports that he quit smoking about 3 years ago. His smoking use included cigarettes. He started smoking about 15 years ago. He has a 12 pack-year smoking history. He has been exposed to tobacco smoke. He has never used smokeless tobacco.               There were no vitals taken for this visit.    PHYSICAL EXAM  Physical Exam   Constitutional: He is oriented to person, place, and time. He appears well-developed and well-nourished. He does not have a sickly appearance. He does not appear ill.   HENT:   Head: Normocephalic and atraumatic.   Pulmonary/Chest: Effort normal.  No respiratory distress.  Neurological: He is alert and oriented to person, place, and time.       Results for orders placed or performed in visit on 09/04/24   H. Pylori Breath Test - Breath, Lung    Specimen: Lung; Breath   Result Value Ref Range    H. pylori Breath Test Negative Negative       Diagnoses  and all orders for this visit:    1. Gastroenteritis (Primary)    --take zofran if needed for n/v  --increase fluid intake  --f/u in 1-2 days if no improvement      FOLLOW-UP  As discussed during visit with PCP/[facility] Care if no improvement or Urgent Care/Emergency Department if worsening of symptoms    Patient verbalizes understanding of medication dosage, comfort measures, instructions for treatment and follow-up.    Kasey Tierney, APRN  09/11/2024  09:16 EDT    The use of a video visit has been reviewed with the patient and verbal informed consent has been obtained. Myself and Yves Cordova participated in this visit. The patient is located in [address] [place] [address].    I am located in [place], [place]. [employer] and [employer] were utilized. I spent 7 minutes in the patient's chart for this visit.      Note Disclaimer: At [facility], we believe that sharing information builds trust and better   relationships. You are receiving this note because you recently visited [facility]. It is possible you   will see health information before a provider has talked with you about it. This kind of information can   be easy to misunderstand. To help you fully understand what it means for your health, we urge you to   discuss this note with your provider.

## 2024-09-11 NOTE — LETTER
September 11, 2024     Patient: Yves Cordova   YOB: 1990   Date of Visit: 9/11/2024       To Whom It May Concern:    It is my medical opinion that Yves Cordova may return to work on Thursday, September 12, 2024.  Please excuse his absence from work on Wednesday, September 11, 2024.             Sincerely,        HAROON Hollis    CC: No Recipients

## 2025-01-09 ENCOUNTER — OFFICE VISIT (OUTPATIENT)
Dept: FAMILY MEDICINE CLINIC | Age: 35
End: 2025-01-09
Payer: COMMERCIAL

## 2025-01-09 ENCOUNTER — LAB (OUTPATIENT)
Dept: LAB | Facility: HOSPITAL | Age: 35
End: 2025-01-09
Payer: COMMERCIAL

## 2025-01-09 VITALS
WEIGHT: 262.6 LBS | HEIGHT: 70 IN | SYSTOLIC BLOOD PRESSURE: 143 MMHG | OXYGEN SATURATION: 97 % | DIASTOLIC BLOOD PRESSURE: 94 MMHG | TEMPERATURE: 97.5 F | BODY MASS INDEX: 37.59 KG/M2 | HEART RATE: 67 BPM

## 2025-01-09 DIAGNOSIS — R19.7 DIARRHEA, UNSPECIFIED TYPE: ICD-10-CM

## 2025-01-09 DIAGNOSIS — Z86.19 HISTORY OF HELICOBACTER PYLORI INFECTION: Primary | ICD-10-CM

## 2025-01-09 LAB
ALBUMIN SERPL-MCNC: 4.3 G/DL (ref 3.5–5.2)
ALBUMIN/GLOB SERPL: 1.3 G/DL
ALP SERPL-CCNC: 76 U/L (ref 39–117)
ALT SERPL W P-5'-P-CCNC: 21 U/L (ref 1–41)
ANION GAP SERPL CALCULATED.3IONS-SCNC: 14.8 MMOL/L (ref 5–15)
AST SERPL-CCNC: 14 U/L (ref 1–40)
BILIRUB SERPL-MCNC: 0.7 MG/DL (ref 0–1.2)
BUN SERPL-MCNC: 12 MG/DL (ref 6–20)
BUN/CREAT SERPL: 9.2 (ref 7–25)
CALCIUM SPEC-SCNC: 9.7 MG/DL (ref 8.6–10.5)
CHLORIDE SERPL-SCNC: 103 MMOL/L (ref 98–107)
CO2 SERPL-SCNC: 23.2 MMOL/L (ref 22–29)
CREAT SERPL-MCNC: 1.31 MG/DL (ref 0.76–1.27)
EGFRCR SERPLBLD CKD-EPI 2021: 73.3 ML/MIN/1.73
GLOBULIN UR ELPH-MCNC: 3.3 GM/DL
GLUCOSE SERPL-MCNC: 95 MG/DL (ref 65–99)
MAGNESIUM SERPL-MCNC: 2 MG/DL (ref 1.6–2.6)
POTASSIUM SERPL-SCNC: 4.1 MMOL/L (ref 3.5–5.2)
PROT SERPL-MCNC: 7.6 G/DL (ref 6–8.5)
SODIUM SERPL-SCNC: 141 MMOL/L (ref 136–145)

## 2025-01-09 PROCEDURE — 36415 COLL VENOUS BLD VENIPUNCTURE: CPT | Performed by: INTERNAL MEDICINE

## 2025-01-09 PROCEDURE — 83013 H PYLORI (C-13) BREATH: CPT | Performed by: INTERNAL MEDICINE

## 2025-01-09 PROCEDURE — 80053 COMPREHEN METABOLIC PANEL: CPT | Performed by: INTERNAL MEDICINE

## 2025-01-09 PROCEDURE — 83735 ASSAY OF MAGNESIUM: CPT | Performed by: INTERNAL MEDICINE

## 2025-01-09 RX ORDER — OMEPRAZOLE 40 MG/1
40 CAPSULE, DELAYED RELEASE ORAL DAILY
Qty: 30 CAPSULE | Refills: 5 | Status: SHIPPED | OUTPATIENT
Start: 2025-01-09

## 2025-01-09 NOTE — LETTER
January 9, 2025     Patient: Yves Cordova   YOB: 1990   Date of Visit: 1/9/2025       To Whom It May Concern:    It is my medical opinion that Yves Cordova may return to work on Monday 1/13/2025       Sincerely,        Adele Kc MD    CC: No Recipients

## 2025-01-09 NOTE — PROGRESS NOTES
"Chief Complaint  Nausea (Has acid reflux frequently ), Diarrhea (Pt stated he has hx of H. Pylori and is having similar symptoms ), and Hot Flashes (Pt stated that they happen around 3 am for the last 2-3 days )    Subjective      Yves Cordova is a 34 y.o. male who presents to [facility]     Patient Care Team:  Carmen Lugo APRN as PCP - General (Nurse Practitioner)  Patient presents to same day clinic with chief complaints of nausea, belching, diarrhea and hot flashes.  His symptoms have been present for 4 days and he is concerned it may be H pylori as he has had this before.  He was diagnosed by breath test and has not had an EGD.  He was treated with antibiotic therapy and symptoms resolved.    Review of Systems  Full review of systems obtained and pertinent positives states in above HPI.  Otherwise all others reviewed and are negative.    Objective   Vital Signs:   Vitals:    01/09/25 1339 01/09/25 1353   BP: (!) 142/104 143/94   BP Location: Left arm Right arm   Patient Position: Sitting Sitting   Cuff Size: Adult Adult   Pulse: 64 67   Temp: 97.5 °F (36.4 °C)    TempSrc: Temporal    SpO2: 97%    Weight: 119 kg (262 lb 9.6 oz)    Height: 177.8 cm (70\")      Body mass index is 37.68 kg/m².    Wt Readings from Last 3 Encounters:   01/09/25 119 kg (262 lb 9.6 oz)   08/01/24 114 kg (250 lb 12.8 oz)   07/23/24 115 kg (254 lb)     BP Readings from Last 3 Encounters:   01/09/25 143/94   08/01/24 138/90   07/23/24 141/95       Health Maintenance   Topic Date Due    ANNUAL PHYSICAL  05/10/2023    COVID-19 Vaccine (4 - 2024-25 season) 01/11/2025 (Originally 9/1/2024)    INFLUENZA VACCINE  03/31/2025 (Originally 7/1/2024)    BMI FOLLOWUP  06/05/2025    TDAP/TD VACCINES (3 - Td or Tdap) 05/10/2032    HEPATITIS C SCREENING  Completed    Pneumococcal Vaccine 0-64  Aged Out       Physical Exam   GEN:NAD, well nourished  HEENT:NCAT, PERRL, EOMI, OP clear, MMM  NECK:supple, no JVD, no " carotid bruits  CVA:RRR s1, s2 no m/r/g  CHEST:CTA B no wheezes or rhonchi  ABD:soft, nontender, nondistended +bs  EXT:no c/c/e 2+PP B  NEURO:CN II-XII grossly nonfocal, no evidence of asterixes or tremor  PSYCH: appropriate mood and affect  :deferred  SKIN: warm, dry, intact, no lesions or rashes    Result Review   The following data was reviewed by: Adele Kc MD on 01/09/2025:  [x]  Tests & Results  []  Hospitalization/Emergency Department/Urgent Care  []  Internal/External Consultant Notes    Procedures          ASSESSMENT/PLAN  Diagnoses and all orders for this visit:    1. History of Helicobacter pylori infection (Primary)  Comments:  check H pylori breath test  omeprazole 40mg po daily  Orders:  -     Ambulatory Referral to Gastroenterology  -     H. Pylori Breath Test - Breath, Lung  -     Comprehensive metabolic panel  -     Magnesium    2. Diarrhea, unspecified type  Comments:  check cmp, mg  referall to GI    Other orders  -     omeprazole (priLOSEC) 40 MG capsule; Take 1 capsule by mouth Daily.  Dispense: 30 capsule; Refill: 5      FOLLOW UP  No follow-ups on file.  Patient was given instructions and counseling regarding his condition or for health maintenance advice. Please see specific information pulled into the AVS if appropriate.       Adele Kc MD  01/09/25  14:10 EST

## 2025-01-10 LAB — UREA BREATH TEST QL: NEGATIVE

## 2025-02-27 ENCOUNTER — TELEPHONE (OUTPATIENT)
Dept: GASTROENTEROLOGY | Facility: CLINIC | Age: 35
End: 2025-02-27
Payer: COMMERCIAL

## 2025-02-27 NOTE — TELEPHONE ENCOUNTER
Spoke with patient regarding rescheduling his appointment on 4/29/25 with Susan Nguyen, due to provider out of the office. Patient has rescheduled his appointment for 3/19/25@2:00

## 2025-03-19 ENCOUNTER — OFFICE VISIT (OUTPATIENT)
Dept: GASTROENTEROLOGY | Facility: CLINIC | Age: 35
End: 2025-03-19
Payer: COMMERCIAL

## 2025-03-19 VITALS
DIASTOLIC BLOOD PRESSURE: 102 MMHG | WEIGHT: 261.4 LBS | HEART RATE: 62 BPM | BODY MASS INDEX: 37.42 KG/M2 | HEIGHT: 70 IN | SYSTOLIC BLOOD PRESSURE: 184 MMHG

## 2025-03-19 DIAGNOSIS — K21.9 GASTROESOPHAGEAL REFLUX DISEASE, UNSPECIFIED WHETHER ESOPHAGITIS PRESENT: ICD-10-CM

## 2025-03-19 DIAGNOSIS — R19.7 DIARRHEA, UNSPECIFIED TYPE: Primary | ICD-10-CM

## 2025-03-19 DIAGNOSIS — Z86.19 HISTORY OF HELICOBACTER PYLORI INFECTION: ICD-10-CM

## 2025-03-19 RX ORDER — COLESTIPOL HYDROCHLORIDE 1 G/1
1-2 TABLET ORAL 2 TIMES DAILY
Qty: 120 TABLET | Refills: 1 | Status: SHIPPED | OUTPATIENT
Start: 2025-03-19

## 2025-03-19 NOTE — PROGRESS NOTES
Chief Complaint     history of H.Pylori and bile in stool    Patient or patient representative verbalized consent for the use of Ambient Listening during the visit with  HAROON Silva for chart documentation. 3/19/2025  13:37 EDT    History of Present Illness     History of Present Illness  The patient presents for evaluation of excessive bowel in his stool.    He has been experiencing excessive bile in his stool for approximately one year, characterized by frequent episodes of diarrhea or loose stools, occurring 5 to 6 times daily, 4 to 5 days per week. The stool is consistently yellow and associated with a burning sensation. He reports no presence of blood in his stool. His gallbladder remains intact. He does not experience any abdominal pain or symptoms such as nausea, vomiting, belching, or heartburn since his H. pylori infection was treated. Occasionally, he experiences morning sickness. He is uncertain if his symptoms are exacerbated by high-fat foods. He has not undergone any stool studies. His symptoms do not consistently occur postprandially but often manifest upon waking. He reports no other health issues and does not consume alcohol. He occasionally suffers from hemorrhoids, which may result in sporadic blood in his stool. His symptoms are currently managed with daily omeprazole.    SOCIAL HISTORY  He does not drink alcohol.         History      Past Medical History:   Diagnosis Date    Anxiety        Past Surgical History:   Procedure Laterality Date    HERNIA REPAIR         Family History   Problem Relation Age of Onset    Diabetes Maternal Grandmother         Current Medications        Current Outpatient Medications:     omeprazole (priLOSEC) 40 MG capsule, Take 1 capsule by mouth Daily., Disp: 30 capsule, Rfl: 5    colestipol (COLESTID) 1 g tablet, Take 1-2 tablets by mouth 2 (Two) Times a Day., Disp: 120 tablet, Rfl: 1     Allergies     Allergies   Allergen Reactions    Penicillins Hives  "      Social History       Social History     Social History Narrative    Not on file       Immunizations     Immunization:  Immunization History   Administered Date(s) Administered    COVID-19 (MODERNA) 1st,2nd,3rd Dose Monovalent 04/13/2021, 05/11/2021, 12/13/2021    Hep B, Adolescent or Pediatric 11/12/2002, 12/12/2002, 03/09/2004    MMR 11/12/2002    Td (TDVAX) 03/09/2004    Tdap 05/10/2022          Objective     Objective     Vital Signs:   BP (!) 184/102 (BP Location: Left arm, Patient Position: Sitting, Cuff Size: Adult)   Pulse 62   Ht 177.8 cm (70\")   Wt 119 kg (261 lb 6.4 oz)   BMI 37.51 kg/m²       Physical Exam    Results      Result Review :   The following data was reviewed by: HAROON Silva on 03/19/2025:    CBC w/diff          7/23/2024    12:15   CBC w/Diff   WBC 7.75    RBC 5.64    Hemoglobin 16.6    Hematocrit 50.1    MCV 88.8    MCH 29.4    MCHC 33.1    RDW 12.1    Platelets 279    Neutrophil Rel % 49.6    Immature Granulocyte Rel % 0.4    Lymphocyte Rel % 40.0    Monocyte Rel % 5.4    Eosinophil Rel % 4.1    Basophil Rel % 0.5      CMP          7/23/2024    12:15 1/9/2025    14:54   CMP   Glucose 96  95    BUN 14  12    Creatinine 1.21  1.31    EGFR 80.6  73.3    Sodium 137  141    Potassium 4.0  4.1    Chloride 104  103    Calcium 10.0  9.7    Total Protein 7.3  7.6    Albumin 4.6  4.3    Globulin 2.7  3.3    Total Bilirubin 0.6  0.7    Alkaline Phosphatase 71  76    AST (SGOT) 18  14    ALT (SGPT) 23  21    Albumin/Globulin Ratio 1.7  1.3    BUN/Creatinine Ratio 11.6  9.2    Anion Gap 13.0  14.8        H. Pylori   Lab Results   Component Value Date    H. pylori Breath Test Negative 01/09/2025       Results  Laboratory Studies  H. pylori breath test negative on 09/04/2024. H. pylori breath test positive on 07/23/2024 and 01/17/2024.    Imaging  Right upper quadrant ultrasound on 01/23/2024 showed liver measures 17.5 cm, mildly increased hepatic echogenicity, no liver masses are " seen on ultrasound, normal gallbladder, about 3 mm.           Assessment and Plan        Assessment and Plan    Diagnoses and all orders for this visit:    1. Diarrhea, unspecified type (Primary)  -     Clostridioides difficile Toxin - Stool, Per Rectum; Future  -     Enteric Bacterial Panel - Stool, Per Rectum; Future  -     Enteric Parasite Panel - Stool, Per Rectum; Future  -     Fecal Lactoferrin Qual. - Stool, Per Rectum; Future  -     Pancreatic Elastase, Fecal - Stool, Per Rectum; Future  -     Occult Blood, Fecal By Immunoassay - Stool, Per Rectum; Future  -     colestipol (COLESTID) 1 g tablet; Take 1-2 tablets by mouth 2 (Two) Times a Day.  Dispense: 120 tablet; Refill: 1    2. History of Helicobacter pylori infection    3. Gastroesophageal reflux disease, unspecified whether esophagitis present        Assessment & Plan  1. Excessive bile in stool.  He reports having loose, yellow, burning stools 4-5 days a week, with 5-6 bowel movements on those days. There is no blood in the stool, and he does not experience abdominal pain. He is currently on omeprazole daily, which helps manage his symptoms. Stool studies will be ordered to rule out parasites, infection, inflammation, and pancreatic insufficiency. A prescription for Colestid has been provided, to be taken after the stool sample has been collected. He is advised to start with one tablet every morning and increase to two if there is no improvement. Potential side effects, including constipation, have been discussed. If the stool studies do not yield conclusive results, a colonoscopy will be considered as the next diagnostic step.            Follow Up        Follow Up   Return in about 6 months (around 9/19/2025) for Diarrhea.  Patient was given instructions and counseling regarding his condition or for health maintenance advice. Please see specific information pulled into the AVS if appropriate.

## 2025-03-20 PROCEDURE — 82653 EL-1 FECAL QUANTITATIVE: CPT

## 2025-03-20 PROCEDURE — 87506 IADNA-DNA/RNA PROBE TQ 6-11: CPT

## 2025-03-20 PROCEDURE — 83630 LACTOFERRIN FECAL (QUAL): CPT

## 2025-03-20 PROCEDURE — 87493 C DIFF AMPLIFIED PROBE: CPT

## 2025-03-20 PROCEDURE — 82274 ASSAY TEST FOR BLOOD FECAL: CPT

## 2025-03-21 ENCOUNTER — LAB (OUTPATIENT)
Dept: LAB | Facility: HOSPITAL | Age: 35
End: 2025-03-21
Payer: COMMERCIAL

## 2025-03-21 DIAGNOSIS — R19.7 DIARRHEA, UNSPECIFIED TYPE: ICD-10-CM

## 2025-03-21 LAB
027 TOXIN: NORMAL
C DIFF TOX GENS STL QL NAA+PROBE: NEGATIVE
HEMOCCULT STL QL IA: POSITIVE
LACTOFERRIN STL QL LA: NEGATIVE

## 2025-03-22 LAB
C COLI+JEJ+UPSA DNA STL QL NAA+NON-PROBE: NOT DETECTED
CRYPTOSP DNA STL QL NAA+NON-PROBE: NOT DETECTED
E HISTOLYT DNA STL QL NAA+NON-PROBE: NOT DETECTED
EC STX1+STX2 GENES STL QL NAA+NON-PROBE: NOT DETECTED
G LAMBLIA DNA STL QL NAA+NON-PROBE: NOT DETECTED
S ENT+BONG DNA STL QL NAA+NON-PROBE: NOT DETECTED
SHIGELLA SP+EIEC IPAH ST NAA+NON-PROBE: NOT DETECTED

## 2025-03-24 ENCOUNTER — RESULTS FOLLOW-UP (OUTPATIENT)
Dept: GASTROENTEROLOGY | Facility: CLINIC | Age: 35
End: 2025-03-24
Payer: COMMERCIAL

## 2025-03-24 ENCOUNTER — PREP FOR SURGERY (OUTPATIENT)
Dept: OTHER | Facility: HOSPITAL | Age: 35
End: 2025-03-24
Payer: COMMERCIAL

## 2025-03-24 DIAGNOSIS — K92.1 BLOOD IN STOOL: Primary | ICD-10-CM

## 2025-03-24 LAB — ELASTASE PANC STL-MCNT: >800 UG ELAST./G

## 2025-03-24 RX ORDER — SODIUM, POTASSIUM,MAG SULFATES 17.5-3.13G
1 SOLUTION, RECONSTITUTED, ORAL ORAL EVERY 12 HOURS
Qty: 354 ML | Refills: 0 | Status: SHIPPED | OUTPATIENT
Start: 2025-03-24

## 2025-03-24 NOTE — TELEPHONE ENCOUNTER
Pt is aware of results and states understanding.     Pt to be scheduled 5/16/2025. [employer] message sent with instructions.     Orders pending

## 2025-03-27 ENCOUNTER — PATIENT ROUNDING (BHMG ONLY) (OUTPATIENT)
Dept: GASTROENTEROLOGY | Facility: CLINIC | Age: 35
End: 2025-03-27
Payer: COMMERCIAL

## 2025-03-27 NOTE — PROGRESS NOTES
"3/27/2025      [facility], may I speak with Yves Cordova     My name is Vy. I am calling from [facility]. I show that you had a recent visit with HAROON Wade.    Before we get started may I verify your date of birth? 1990    I am calling to officially welcome you to our practice and ask about your recent visit. Is this a good time to talk?  Yes     Tell me about your visit with us. What things went well? \"It was a good visit, Lisy ordered testing & prescribed medication that is helping\"    We strive to ensure that we protect your safety and privacy. Is there anything we could have done to improve this during your visit?    No     We're always looking for ways to make our patients' experiences even better. Do you have recommendations on ways we may improve?  No     Overall were you satisfied with your first visit to our practice?  Yes     I appreciate you taking the time to speak with me today. Is there anything else I can do for you?  No     I am glad to hear that you had a very good visit and I appreciate you taking the time to provide feedback on this call. We would greatly appreciate you filling out a survey if you receive one in the mail, email or text. This is a great opportunity to provide any additional feedback that you may think of after this call as well.       Thank you, and have a great day.   "

## 2025-04-14 ENCOUNTER — TELEPHONE (OUTPATIENT)
Dept: FAMILY MEDICINE CLINIC | Age: 35
End: 2025-04-14
Payer: COMMERCIAL

## 2025-04-14 NOTE — TELEPHONE ENCOUNTER
I called pt to schedule appointment with new provider.. pt mother stated that he would call back./al

## 2025-04-15 ENCOUNTER — TELEPHONE (OUTPATIENT)
Dept: GASTROENTEROLOGY | Facility: CLINIC | Age: 35
End: 2025-04-15
Payer: COMMERCIAL

## 2025-04-15 NOTE — TELEPHONE ENCOUNTER
ENDO RECONCILIATION  Verify source of procedure(s): TE  If other, please list source: 3/24/25    TIME OUT-CONFIRM CORRECT PROCEDURE: Colonoscopy  Cardiology:  Pulmonology:  Blood thinner:   GLP-1:  Additional DX/indication for procedure:     Please include any other notes relevant to endo reconciliation: N/A

## 2025-05-02 ENCOUNTER — HOSPITAL ENCOUNTER (EMERGENCY)
Facility: HOSPITAL | Age: 35
Discharge: HOME | End: 2025-05-02
Attending: EMERGENCY MEDICINE
Payer: OTHER MISCELLANEOUS

## 2025-05-02 ENCOUNTER — APPOINTMENT (OUTPATIENT)
Dept: GENERAL RADIOLOGY | Facility: HOSPITAL | Age: 35
End: 2025-05-02
Payer: OTHER MISCELLANEOUS

## 2025-05-02 VITALS
OXYGEN SATURATION: 99 % | TEMPERATURE: 98.1 F | DIASTOLIC BLOOD PRESSURE: 89 MMHG | WEIGHT: 257.5 LBS | BODY MASS INDEX: 36.86 KG/M2 | HEIGHT: 70 IN | HEART RATE: 89 BPM | SYSTOLIC BLOOD PRESSURE: 139 MMHG | RESPIRATION RATE: 16 BRPM

## 2025-05-02 DIAGNOSIS — S83.006A PATELLAR DISLOCATION, INITIAL ENCOUNTER: Primary | ICD-10-CM

## 2025-05-02 PROCEDURE — 73560 X-RAY EXAM OF KNEE 1 OR 2: CPT

## 2025-05-02 PROCEDURE — 99283 EMERGENCY DEPT VISIT LOW MDM: CPT

## 2025-05-02 RX ORDER — HYDROCODONE BITARTRATE AND ACETAMINOPHEN 5; 325 MG/1; MG/1
1 TABLET ORAL EVERY 6 HOURS PRN
Qty: 12 TABLET | Refills: 0 | Status: SHIPPED | OUTPATIENT
Start: 2025-05-02

## 2025-05-02 NOTE — ED PROVIDER NOTES
"SHARED VISIT NOTE:    Patient is 34 y.o. year old male that presents to the ED for evaluation of severe knee pain.     Physical Exam    ED Course:    /89 (BP Location: Right arm, Patient Position: Lying)   Pulse 50   Temp 98.6 °F (37 °C) (Oral)   Resp 16   Ht 177.8 cm (70\")   Wt 117 kg (257 lb 8 oz)   SpO2 100%   BMI 36.95 kg/m²   Results for orders placed or performed in visit on 03/21/25   Enteric Bacterial Panel - Stool, Per Rectum    Collection Time: 03/20/25  7:00 PM    Specimen: Per Rectum; Stool   Result Value Ref Range    Salmonella Not Detected Not Detected    Campylobacter Not Detected Not Detected    Shigella/Enteroinvasive E. coli (EIEC) Not Detected Not Detected    Shiga-like toxin-producing E. coli (STEC) stx1/stx2 Not Detected Not Detected   Enteric Parasite Panel - Stool, Per Rectum    Collection Time: 03/20/25  7:00 PM    Specimen: Per Rectum; Stool   Result Value Ref Range    Giardia lamblia Not Detected Not Detected    Cryptosporidium Not Detected Not Detected    Entamoeba histolytica Not Detected Not Detected   Clostridioides difficile Toxin, PCR - Stool, Per Rectum    Collection Time: 03/20/25  7:00 PM    Specimen: Per Rectum; Stool   Result Value Ref Range    Toxigenic C. difficile by PCR Negative Negative    027 Toxin Presumptive Negative    Occult Blood, Fecal By Immunoassay - Stool, Per Rectum    Collection Time: 03/20/25  7:00 PM    Specimen: Per Rectum; Stool   Result Value Ref Range    Occult Blood, Fecal by Immunoassay Positive (A) Negative   Fecal Lactoferrin Qual. - Stool, Per Rectum    Collection Time: 03/20/25  7:00 PM    Specimen: Per Rectum; Stool   Result Value Ref Range    Lactoferrin, Qual Negative Negative   Pancreatic Elastase, Fecal - Stool, Per Rectum    Collection Time: 03/20/25  7:00 PM    Specimen: Per Rectum; Stool   Result Value Ref Range    Pancreatic Fecal >800 >200 ug Elast./g     Medications - No data to display  XR Knee 1 or 2 View Right  Result Date: " 5/2/2025  Narrative: XR KNEE 1 OR 2 VW RIGHT Date of Exam: 5/2/2025 9:46 AM EDT Indication: right knee injury, pain Comparison: None available. FINDINGS:  There is lateral dislocation of the patella on the AP view. No displaced fracture is identified. Small knee effusion. Mineralization appears within limits.     Impression: 1.Lateral dislocation of the patella. 2.Small knee effusion. Electronically Signed: Pablo Badillo  5/2/2025 9:55 AM EDT  Workstation ID: [id number]      MDM: This is a 34-year-old male who presents for evaluation of right knee pain due to dislocated patella.  Dislocation seems to have been inadvertently reduced during x-ray.  Patient placed in knee immobilizer and given crutches.    Procedures    X-ray were performed in the emergency department and all X-ray impressions were independently interpreted by me.          SHARED VISIT ATTESTATION:    This visit was performed by both myself and an APC.  I performed the substantive portion of the medical decision making. The management plan was made or approved by me, and I take responsibility for patient management.           Francisco Javier Pettit MD  10:22 EDT  05/02/25         Francisco Javier Pettit MD  05/02/25 1022

## 2025-05-02 NOTE — Clinical Note
[facility] EMERGENCY ROOM  [address] [place] [address]  Phone: [phone]  Fax: [phone]    Yves Cordova was seen and treated in our emergency department on 5/2/2025.  He may return to work on 05/09/2025.         Thank you for choosing [facility].    Isabela Arana, APRN

## 2025-05-02 NOTE — ED PROVIDER NOTES
Time: 9:29 AM EDT  Date of encounter:  5/2/2025  Independent Historian/Clinical History and Information was obtained by:   Patient    History is limited by: N/A    Chief Complaint: Knee pain      History of Present Illness:  Patient is a 34 y.o. year old male who presents to the emergency department for evaluation of right knee pain after patient reports he stepped on a shovel and felt his knee pop while at work this morning.  Patient reports no other injuries.  Reports he was able to stand but has been unable to take a step on affected extremity since the injury.  Patient was brought in by EMS who gave 8 mg of morphine and 100 mcg of fentanyl in route to the ED.  Patient reports pain is adequately controlled upon arrival.      Patient Care Team  Primary Care Provider: Carmen Lugo APRN    Past Medical History:     Allergies   Allergen Reactions    Penicillins Hives     Past Medical History:   Diagnosis Date    Anxiety      Past Surgical History:   Procedure Laterality Date    HERNIA REPAIR       Family History   Problem Relation Age of Onset    Diabetes Maternal Grandmother        Home Medications:  Prior to Admission medications    Medication Sig Start Date End Date Taking? Authorizing Provider   colestipol (COLESTID) 1 g tablet Take 1-2 tablets by mouth 2 (Two) Times a Day. 3/19/25   Susan gNuyen APRN   omeprazole (priLOSEC) 40 MG capsule Take 1 capsule by mouth Daily. 1/9/25   Adele Kc MD   sodium-potassium-magnesium sulfates (Suprep Bowel Prep Kit) 17.5-3.13-1.6 GM/177ML solution oral solution Take 1 bottle by mouth Every 12 (Twelve) Hours. 3/24/25 5/2/25  Susan Nguyen APRN        Social History:   Social History     Tobacco Use    Smoking status: Former     Current packs/day: 0.00     Average packs/day: 1 pack/day for 12.0 years (12.0 ttl pk-yrs)     Types: Cigarettes     Start date: 5/10/2009     Quit date: 5/10/2021     Years since quitting: 3.9     Passive exposure: Past     "Smokeless tobacco: Never   Vaping Use    Vaping status: Never Used   Substance Use Topics    Alcohol use: Never    Drug use: Never         Review of Systems:  Review of Systems   Constitutional:  Negative for chills, fatigue and fever.   HENT:  Negative for ear pain, rhinorrhea and sore throat.    Eyes:  Negative for visual disturbance.   Respiratory:  Negative for cough and shortness of breath.    Cardiovascular:  Negative for chest pain.   Gastrointestinal:  Negative for abdominal pain, diarrhea and vomiting.   Genitourinary:  Negative for difficulty urinating.   Musculoskeletal:  Positive for arthralgias. Negative for back pain and myalgias.   Skin:  Negative for rash.   Neurological:  Negative for light-headedness and headaches.   Hematological:  Negative for adenopathy.   Psychiatric/Behavioral: Negative.          Physical Exam:  /89 (BP Location: Right arm, Patient Position: Lying)   Pulse 89   Temp 98.1 °F (36.7 °C) (Oral)   Resp 16   Ht 177.8 cm (70\")   Wt 117 kg (257 lb 8 oz)   SpO2 99%   BMI 36.95 kg/m²     Physical Exam  Vitals and nursing note reviewed.   Constitutional:       General: He is not in acute distress.     Appearance: Normal appearance. He is not toxic-appearing.   HENT:      Head: Normocephalic and atraumatic.      Nose: Nose normal.      Mouth/Throat:      Mouth: Mucous membranes are moist.   Eyes:      Conjunctiva/sclera: Conjunctivae normal.   Cardiovascular:      Rate and Rhythm: Normal rate and regular rhythm.      Pulses: Normal pulses.      Heart sounds: Normal heart sounds.   Pulmonary:      Effort: Pulmonary effort is normal.      Breath sounds: Normal breath sounds.   Abdominal:      General: Bowel sounds are normal.      Palpations: Abdomen is soft.      Tenderness: There is no abdominal tenderness.   Musculoskeletal:         General: Swelling, tenderness (Right knee) and deformity present. Normal range of motion.      Cervical back: Normal range of motion.   Skin:    "  General: Skin is warm and dry.   Neurological:      General: No focal deficit present.      Mental Status: He is alert and oriented to person, place, and time.   Psychiatric:         Mood and Affect: Mood normal.         Behavior: Behavior normal.         Thought Content: Thought content normal.         Judgment: Judgment normal.                    Medical Decision Making:      Comorbidities that affect care:    None    External Notes reviewed:    None      The following orders were placed and all results were independently analyzed by me:  Orders Placed This Encounter   Procedures    [facility] 13. Crutches (), 07. Knee Immobilizer (); Right; Right sided injury    XR Knee 1 or 2 View Right    Ambulatory Referral to Orthopedic Surgery    Obtain & Apply The Following- Lower extremity; Knee immobilizer    Crutches (fit & training)       Medications Given in the Emergency Department:  Medications - No data to display     ED Course:    ED Course as of 05/02/25 1625   Fri May 02, 2025   1000 Upon reevaluation after patient received x-rays patella is not displaced any longer and patient has full range of motion of the right knee. [CE]      ED Course User Index  [CE] Isabela Arana, HAROON       Labs:    Lab Results (last 24 hours)       ** No results found for the last 24 hours. **             Imaging:    XR Knee 1 or 2 View Right  Result Date: 5/2/2025  XR KNEE 1 OR 2 VW RIGHT Date of Exam: 5/2/2025 9:46 AM EDT Indication: right knee injury, pain Comparison: None available. FINDINGS:  There is lateral dislocation of the patella on the AP view. No displaced fracture is identified. Small knee effusion. Mineralization appears within limits.     1.Lateral dislocation of the patella. 2.Small knee effusion. Electronically Signed: Pablo Badillo  5/2/2025 9:55 AM EDT  Workstation ID: [id number]        Differential Diagnosis and Discussion:    Extremity Pain: Differential diagnosis includes but is not limited to  soft tissue sprain, tendonitis, tendon injury, dislocation, fracture, deep vein thrombosis, arterial insufficiency, osteoarthritis, bursitis, and ligamentous damage.    PROCEDURES:    X-ray were performed in the emergency department and all X-ray impressions were independently interpreted by me.    No orders to display       Procedures    MDM  Number of Diagnoses or Management Options  Patellar dislocation, initial encounter  Diagnosis management comments: I have explained the patient´s condition, diagnoses and treatment plan based on the information available to me at this time. I have answered questions and addressed any concerns. The patient has a good  understanding of the patient´s diagnosis, condition, and treatment plan as can be expected at this point. The vital signs have been stable. The patient´s condition is stable and appropriate for discharge from the emergency department.      The patient will pursue further outpatient evaluation with the primary care physician or other designated or consulting physician as outlined in the discharge instructions. They are agreeable to this plan of care and follow-up instructions have been explained in detail. The patient has received these instructions in written format and has expressed an understanding of the discharge instructions. The patient is aware that any significant change in condition or worsening of symptoms should prompt an immediate return to this or the closest emergency department or call to 911.         Amount and/or Complexity of Data Reviewed  Tests in the radiology section of CPT®: reviewed                       Patient Care Considerations:    CT EXTREMITY: I considered ordering an extremity CT, however patellar dislocation visualized on plain film xr and resolved on re-exam.       Consultants/Shared Management Plan:    SHARED VISIT: I have discussed the case with my supervising physician, Dr. Pettit who states patient may be placed in a knee  immobilizer, given crutches and follow-up with orthopedics outpatient.. The substantive portion of the medical decision was made by the attesting physician who made or approve the management plan and will take responsibility for the patient.  Clinical findings were discussed and ultimate disposition was made in consult with supervising physician.    Social Determinants of Health:    Patient is independent, reliable, and has access to care.       Disposition and Care Coordination:    Discharged: The patient is suitable and stable for discharge with no need for consideration of admission.    I have explained the patient´s condition, diagnoses and treatment plan based on the information available to me at this time. I have answered questions and addressed any concerns. The patient has a good  understanding of the patient´s diagnosis, condition, and treatment plan as can be expected at this point. The vital signs have been stable. The patient´s condition is stable and appropriate for discharge from the emergency department.      The patient will pursue further outpatient evaluation with the primary care physician or other designated or consulting physician as outlined in the discharge instructions. They are agreeable to this plan of care and follow-up instructions have been explained in detail. The patient has received these instructions in written format and has expressed an understanding of the discharge instructions. The patient is aware that any significant change in condition or worsening of symptoms should prompt an immediate return to this or the closest emergency department or call to 911.  I have explained discharge medications and the need for follow up with the patient/caretakers. This was also printed in the discharge instructions. Patient was discharged with the following medications and follow up:      Medication List        New Prescriptions      HYDROcodone-acetaminophen 5-325 MG per tablet  Commonly  known as: NORCO  Take 1 tablet by mouth Every 6 (Six) Hours As Needed for Moderate Pain.               Where to Get Your Medications        These medications were sent to [facility] - [place], [place] - [address] - [phone]  - [phone] FX  [address], [facility] [address]      Phone: [phone]   HYDROcodone-acetaminophen 5-325 MG per tablet      Carmen Lugo, APRN  [address]  [address]  [facility] [address] [phone]    Go to   As needed    [facility]  [address] [facility] [id number] [phone]  Schedule an appointment as soon as possible for a visit          Final diagnoses:   Patellar dislocation, initial encounter        ED Disposition       ED Disposition   Discharge    Condition   Stable    Comment   --               This medical record created using voice recognition software.             Isabela Arana, APRN  05/02/25 [id number]

## 2025-05-02 NOTE — Clinical Note
[facility] EMERGENCY ROOM  [address] [place] [address]  Phone: [phone]  Fax: [phone]    Yves Cordova was seen and treated in our emergency department on 5/2/2025.  He may return to work on 05/09/2025.         Thank you for choosing [facility].    Isabela Arana, APRN

## 2025-05-02 NOTE — DISCHARGE INSTRUCTIONS
Someone should contact you to schedule appoint with an orthopedist.  If you do not hear from them in 1-2 business days you may contact the number on this packet to schedule an appointment.  Please wear your knee immobilizer and use crutches until you are able to follow-up with orthopedics.  You are prescribed pain medication to your pharmacy.  You may also take ibuprofen as needed for this pain medication.

## 2025-05-08 ENCOUNTER — TELEPHONE (OUTPATIENT)
Dept: GASTROENTEROLOGY | Facility: CLINIC | Age: 35
End: 2025-05-08
Payer: COMMERCIAL

## 2025-05-08 NOTE — LETTER
Sent via  Regular Mail            May 9, 2025    Yves JESSI Cordova  [address] [address]        Dear Mr. Cordova,       Thank you for choosing [facility]. This letter is in regard to your recent visit to our office. Your provider recommended  Colonoscopy  . So far, we have been unsuccessful in connecting with you to schedule. It is important for you to understand that  Colonoscopy  is ordered to assure we are providing the best care possible. In some cases, we are trying to detect problems that could potentially have serious health consequences. Please contact us at [phone] and one of our representatives will assist you in scheduling your recommended appointment.      If you have completed the recommendation elsewhere please contact us as soon as possible so we can obtain the results and update your medical records.     We value you as a patient and thank you for allowing [facility] to provide for all of your healthcare needs.       Sincerely,        [facility]

## 2025-05-08 NOTE — TELEPHONE ENCOUNTER
Caller: Yves Cordova     Relationship: SELF    Best call back number: [phone]    What is your medical concern? PT HAS SCOPE SCHEDULED 05.13.25. PT HAS A DISLOCATED KNEE. PT WANTS TO KNOW IF THAT WILL DETER HIM FROM BEING ABLE TO HAVE SCOPE. PLEASE CONTACT PT TO ADVISE.

## 2025-05-09 ENCOUNTER — HOSPITAL ENCOUNTER (OUTPATIENT)
Dept: MRI IMAGING | Facility: HOSPITAL | Age: 35
Discharge: HOME | End: 2025-05-09
Admitting: ORTHOPAEDIC SURGERY
Payer: OTHER MISCELLANEOUS

## 2025-05-09 ENCOUNTER — OFFICE VISIT (OUTPATIENT)
Dept: ORTHOPEDIC SURGERY | Facility: CLINIC | Age: 35
End: 2025-05-09
Payer: COMMERCIAL

## 2025-05-09 ENCOUNTER — TELEPHONE (OUTPATIENT)
Dept: GASTROENTEROLOGY | Facility: CLINIC | Age: 35
End: 2025-05-09
Payer: COMMERCIAL

## 2025-05-09 VITALS
SYSTOLIC BLOOD PRESSURE: 159 MMHG | HEART RATE: 110 BPM | HEIGHT: 70 IN | WEIGHT: 257 LBS | OXYGEN SATURATION: 96 % | DIASTOLIC BLOOD PRESSURE: 103 MMHG | BODY MASS INDEX: 36.79 KG/M2

## 2025-05-09 DIAGNOSIS — S83.004A PATELLAR DISLOCATION, RIGHT, INITIAL ENCOUNTER: ICD-10-CM

## 2025-05-09 DIAGNOSIS — S83.004A PATELLAR DISLOCATION, RIGHT, INITIAL ENCOUNTER: Primary | ICD-10-CM

## 2025-05-09 PROCEDURE — 99203 OFFICE O/P NEW LOW 30 MIN: CPT | Performed by: ORTHOPAEDIC SURGERY

## 2025-05-09 PROCEDURE — 73721 MRI JNT OF LWR EXTRE W/O DYE: CPT

## 2025-05-09 NOTE — PROGRESS NOTES
"Chief Complaint  Initial Evaluation of the Right Knee     Subjective      Yves Cordova presents to [facility] for initial evaluation of the right knee.  He went to the ED on 25.  He had X rays and placed in a knee immobilizer.  He is on crutches and has swelling.  He had right knee pain after patient reports he stepped on a shovel and felt his knee pop while at work this morning.     Allergies   Allergen Reactions    Penicillins Hives        Social History     Socioeconomic History    Marital status: Single   Tobacco Use    Smoking status: Former     Current packs/day: 0.00     Average packs/day: 1 pack/day for 12.0 years (12.0 ttl pk-yrs)     Types: Cigarettes     Start date: 5/10/2009     Quit date: 5/10/2021     Years since quittin.0     Passive exposure: Past    Smokeless tobacco: Never   Vaping Use    Vaping status: Never Used   Substance and Sexual Activity    Alcohol use: Never    Drug use: Never        I reviewed the patient's chief complaint, history of present illness, review of systems, past medical history, surgical history, family history, social history, medications, and allergy list.     Review of Systems     Constitutional: Denies fevers, chills, weight loss  Cardiovascular: Denies chest pain, shortness of breath  Skin: Denies rashes, acute skin changes  Neurologic: Denies headache, loss of consciousness        Vital Signs:   BP (!) 159/103 Comment: Provider made aware  Pulse 110   Ht 177.8 cm (70\")   Wt 117 kg (257 lb)   SpO2 96%   BMI 36.88 kg/m²          Physical Exam  General: Alert. No acute distress    Ortho Exam        RIGHT KNEE  Extension 0. Stable to varus/valgus stress. Stable to anterior/posterior drawer. Neurovascularly intact.EHL, FHL, HS and TA. Sensation intact to light touch all 5 nerves of the foot. Ambulates with Antalgic gait. Patella has weakness due to dislocation.   Calf supple, non-tender.   Knee Extensor Mechanism intact Moderate " swelling.         Procedures        Imaging Results (Most Recent)       None             Result Review :         XR Knee 1 or 2 View Right  Result Date: 5/2/2025  Narrative: XR KNEE 1 OR 2 VW RIGHT Date of Exam: 5/2/2025 9:46 AM EDT Indication: right knee injury, pain Comparison: None available. FINDINGS:  There is lateral dislocation of the patella on the AP view. No displaced fracture is identified. Small knee effusion. Mineralization appears within limits.     Impression: 1.Lateral dislocation of the patella. 2.Small knee effusion. Electronically Signed: Pablo Badillo  5/2/2025 9:55 AM EDT  Workstation ID: [id number]             Assessment and Plan     Diagnoses and all orders for this visit:    1. Patellar dislocation, right, initial encounter (Primary)        Discussed the treatment plan with the patient. I reviewed the X-rays that were obtained 5/2/25 with the patient.     MRI of the right knee due to patellar dislocation.  WBAT with knee immobilizer and crutches.  Wear at bed time also.  Work on isometric quadriceps exercises.      Ice as needed.      Work note given.        Call or return if worsening symptoms.    Follow Up     After MRI of the right knee.        Patient was given instructions and counseling regarding his condition or for health maintenance advice. Please see specific information pulled into the AVS if appropriate.     Scribed for Dameon Vargas MD by Sonia Ivy MA.  05/09/25   08:55 EDT    I have personally performed the services described in this document as scribed by the above individual and it is both accurate and complete. Dameon Vargas MD 05/12/25

## 2025-05-09 NOTE — TELEPHONE ENCOUNTER
Provider: NELIDA MCDANIEL    Caller: Yves Cordova    Relationship to Patient: Self    Phone Number: [phone]    Reason for Call: PATIENT CALLED IN AND STATED THAT HE WOULD LIKE TO CANCEL HIS UPCOMING PROCEDURE SCHEDULED FOR 05/13/2025 DUE TO HAVE A KNEE DISLOCATION ISSUE. PLEASE CANCEL.

## 2025-05-09 NOTE — TELEPHONE ENCOUNTER
Yves Cordova  1990    Patient requested to cancel their Colonoscopy. I have offered to reschedule this patient and patient has declined.    Reason for cancelling: Dislocated knee unable to make it to restroom.     Original date of case request: 5/13/2025    This procedure was ordered by HAROON Wade for an important reason. I have thoroughly discussed with the patient that there are risks associated with not proceeding with the procedure at this time such as a delay in diagnosis, risk of incurable disease, or cancer. Patient verbalized understanding the risks discussed.    Patient plans to call us back to reschedule: Yes    Is there a follow up appointment that needs to be cancelled? No    Has endo scheduling been notified? Yes    If yes, who did you speak to? Mabel     Has the case request been updated? Yes    Has a letter been mailed to the patient? Yes

## 2025-05-12 ENCOUNTER — OFFICE VISIT (OUTPATIENT)
Dept: ORTHOPEDIC SURGERY | Facility: CLINIC | Age: 35
End: 2025-05-12
Payer: COMMERCIAL

## 2025-05-12 VITALS
SYSTOLIC BLOOD PRESSURE: 164 MMHG | BODY MASS INDEX: 36.79 KG/M2 | WEIGHT: 257 LBS | HEIGHT: 70 IN | OXYGEN SATURATION: 97 % | DIASTOLIC BLOOD PRESSURE: 101 MMHG | HEART RATE: 98 BPM

## 2025-05-12 DIAGNOSIS — S83.004A PATELLAR DISLOCATION, RIGHT, INITIAL ENCOUNTER: Primary | ICD-10-CM

## 2025-05-12 PROCEDURE — 99213 OFFICE O/P EST LOW 20 MIN: CPT | Performed by: ORTHOPAEDIC SURGERY

## 2025-05-12 NOTE — PROGRESS NOTES
"Chief Complaint  Follow-up of the Right Knee       Subjective      Yves Cordova presents to [facility] for a follow up for his right knee. He was last seen in the office on 25 where we ordered a STAT MRI on his right knee. He presents to the office today for these results. To review, he stepped on a shovel when he felt a pop to his knee while he was at work which happened on 25. He presents today in a knee immobilizer and is ambulating with crutches.     Allergies   Allergen Reactions    Penicillins Hives        Social History     Socioeconomic History    Marital status: Single   Tobacco Use    Smoking status: Former     Current packs/day: 0.00     Average packs/day: 1 pack/day for 12.0 years (12.0 ttl pk-yrs)     Types: Cigarettes     Start date: 5/10/2009     Quit date: 5/10/2021     Years since quittin.0     Passive exposure: Past    Smokeless tobacco: Never   Vaping Use    Vaping status: Never Used   Substance and Sexual Activity    Alcohol use: Never    Drug use: Never        I reviewed the patient's chief complaint, history of present illness, review of systems, past medical history, surgical history, family history, social history, medications, and allergy list.     Review of Systems     Constitutional: Denies fevers, chills, weight loss  Cardiovascular: Denies chest pain, shortness of breath  Skin: Denies rashes, acute skin changes  Neurologic: Denies headache, loss of consciousness  MSK: Right knee pain       Vital Signs:   BP (!) 164/101 Comment: provider made aware  Pulse 98   Ht 177.8 cm (70\")   Wt 117 kg (257 lb)   SpO2 97%   BMI 36.88 kg/m²          Physical Exam  General: Alert. No acute distress    Ortho Exam        Right lower extremity: full extension, stable to varus/valgus stress, extensor mechanism  intact, moderate swelling and effusion, distal neurovascularly intact, positive  pulses, limited range of motion secondary to pain, calf soft, " positive EHL, FHL, GS, and TA. Sensation intact to all 5 nerves of the foot.     Procedures        Imaging Results (Most Recent)       None             Result Review :     MRI Knee Right Without Contrast  Result Date: 5/9/2025  Narrative: MRI KNEE RIGHT  WO CONTRAST Date of Exam: 5/9/2025 12:51 PM EDT Indication: Right knee pain.  Comparison: Knee radiographs 5/2/2025 Technique:  Routine multiplanar/multisequence images of the right knee were obtained without contrast administration. Findings: Medial compartment: Oblique linear signal along the posterior horn of the medial meniscus may reflect tear. Chondral surface irregularities without full thickness chondral loss. No evidence of subchondral edema. Lateral compartment: No evidence of meniscal tear. Articular cartilage is intact. No evidence of subchondral edema. Patellofemoral compartment: Lateral subluxation of the patella. Articular cartilage is intact. No evidence of subchondral edema. Medial plica is not present. Extensor mechanism: Patellar tendon is intact. Quadriceps tendon is intact. Ligaments: Anterior cruciate ligament is intact. Posterior cruciate ligament is intact. Low-grade partial tear of the proximal posterior fibers medial collateral ligament. Lateral collateral ligament complex is intact including the fibular collateral ligament, the biceps femoris tendon and the iliotibial band. There is irregularity and increased signal at the patellar attachment of the medial patellofemoral ligament suggestive of tear. Muscles and tendons: The popliteus muscle and tendon appear intact. No evidence of pes anserine bursitis. The remaining visualized muscles and tendons appear intact. Joint: Large knee joint effusion with synovitis. No Baker's cyst is seen. Bones: Contusion/impaction fracture of the lateral femoral condyle and medial patellar facet from prior transient lateral patellar dislocation. No suspicious marrow replacing lesions seen. Soft tissues:  Neurovascular structures appear intact. Mild medial joint line edema. There is edema along the lateral and posterior subcutaneous soft tissues. No soft tissue masses or fluid collections seen.     Impression: Impression: 1.Contusion/impaction fracture of the lateral femoral condyle and medial patellar facet from prior transient lateral patellar dislocation. 2.Irregularity and increased signal at the patellar attachment of the medial patellofemoral ligament suggestive of tear. 3.Low-grade partial tear of the proximal posterior fibers of the medial collateral ligament. 4.Oblique linear signal along the posterior horn of the medial meniscus may reflect tear. 5.Large knee joint effusion with synovitis. Electronically Signed: Seth Ceballos MD  5/9/2025 2:36 PM EDT  Workstation ID: [id number]    XR Knee 1 or 2 View Right  Result Date: 5/2/2025  Narrative: XR KNEE 1 OR 2 VW RIGHT Date of Exam: 5/2/2025 9:46 AM EDT Indication: right knee injury, pain Comparison: None available. FINDINGS:  There is lateral dislocation of the patella on the AP view. No displaced fracture is identified. Small knee effusion. Mineralization appears within limits.     Impression: 1.Lateral dislocation of the patella. 2.Small knee effusion. Electronically Signed: Pablo Badillo  5/2/2025 9:55 AM EDT  Workstation ID: [id number]             Assessment and Plan     Diagnoses and all orders for this visit:    1. Patellar dislocation, right, initial encounter (Primary)        The patient presents here today for a follow up for his right knee, MRI results was discussed and reviewed with the patient today in the office.     He will continue the knee immobilizer for three weeks but can be weight bearing ad tolerated.     Order placed today for physical therapy and will start this in two weeks      He will continue over the counter medications for pain control. Work note provided today.     Due to the patients high blood pressure reading today, I advised the  patient to contact their PCP.      Call or return if worsening symptoms.    Follow Up     4 weeks       Patient was given instructions and counseling regarding his condition or for health maintenance advice. Please see specific information pulled into the AVS if appropriate.     Scribed for Dameon Vargas MD by Belinda Black.  05/12/25   10:14 EDT    I have personally performed the services described in this document as scribed by the above individual and it is both accurate and complete. Dameon Vargas MD 05/12/25

## 2025-05-21 ENCOUNTER — TREATMENT (OUTPATIENT)
Dept: PHYSICAL THERAPY | Facility: CLINIC | Age: 35
End: 2025-05-21
Payer: OTHER MISCELLANEOUS

## 2025-05-21 ENCOUNTER — TELEPHONE (OUTPATIENT)
Dept: ORTHOPEDIC SURGERY | Facility: CLINIC | Age: 35
End: 2025-05-21

## 2025-05-21 DIAGNOSIS — M25.561 ACUTE PAIN OF RIGHT KNEE: ICD-10-CM

## 2025-05-21 DIAGNOSIS — R29.898 WEAKNESS OF RIGHT LEG: ICD-10-CM

## 2025-05-21 DIAGNOSIS — M25.661 DECREASED RANGE OF MOTION (ROM) OF RIGHT KNEE: ICD-10-CM

## 2025-05-21 DIAGNOSIS — S83.004A PATELLAR DISLOCATION, RIGHT, INITIAL ENCOUNTER: Primary | ICD-10-CM

## 2025-05-21 DIAGNOSIS — R26.9 GAIT DISTURBANCE: ICD-10-CM

## 2025-05-21 NOTE — PROGRESS NOTES
Physical Therapy Initial Evaluation and Plan of Care  [facility], [address], [place] [address]      Patient: Yves Cordova   : 1990  Diagnosis/ICD-10 Code:  Patellar dislocation, right, initial encounter [S83.004A]  Referring practitioner: Dameon Vargas MD  Date of Initial Visit: 2025  Today's Date: 2025  Patient seen for 1 sessions         Visit Diagnoses:    ICD-10-CM ICD-9-CM   1. Patellar dislocation, right, initial encounter  S83.004A 836.3   2. Acute pain of right knee  M25.561 719.46   3. Decreased range of motion (ROM) of right knee  M25.661 719.56   4. Weakness of right leg  R29.898 729.89   5. Gait disturbance  R26.9 781.2         Subjective Evaluation    History of Present Illness  Mechanism of injury: On 25, pt was working and he stepped on a shovel and felt a pop in the R knee.  He could tell the kneecap was dislocated, it was on the side of the leg.  An ambulance picked up from the job site, took him to [facility], and the patella relocated when they did the x-ray.  He saw ortho on 25, ordered an MRI, then had another follow up with ortho on 25.  No surgery needed, but will progress with physical therapy.     He is to stay in the knee immobilzer for 3 weeks (from last ortho visit on 25) and using his crutches, WBAT.      He notes that the R shoulder is giving him more pain than the knee itself.  Mom took him to the urgent care to make sure the shoulder wasn't dislocated.  No imaging was taken, but they did check the ROM in all directions, and it was full.  No dislocation.  He was given muscle relaxer.     Mom is present on evaluation today.     Works on a golf course as .  Mows, digs holes, cut trees, walking, weed-eating, etc.    Pain  Current pain ratin  At worst pain ratin (accidentaly bent the knee yesterday)  Quality: sharp  Relieving factors: ice (taking pain medicine for the shoulder, not the knee)  Aggravating  factors: stairs, standing, ambulation, prolonged positioning and lifting    Social Support  Lives in: multiple-level home  Lives with: parents    Hand dominance: right    Patient Goals  Patient goals for therapy: decreased edema, decreased pain, improved balance, increased motion, increased strength, independence with ADLs/IADLs, return to sport/leisure activities and return to work             Objective           General Comments     Knee Comments  R knee AROM  Flexion: not tested due to knee immobilizer  Extension: 0 degrees    Increased quad atrophy and calf atrophy on the RLE    R knee MMT  Flexion: not tested/5  Extension: 3+/5    R hip MMT - not tested  Flexion: /5  Extension: /5  Abduction: /5  Adduction: /5    WB Status: WBAT and patient is using an assistive device (L axillary crutch and R knee immobilizer).  Pt is having trouble using the R crutch because of the shoulder pain he is experiencing.  He has been using the 1 crutch for the last 2-3 days.  He does scoot on his butt when navigating the stairs because it hurts too bad on the shoulder to use the railing/crutches.     Swelling: mild swelling around the quad    Radicular Symptoms: none noted    Tenderness: R knee joint line         Assessment & Plan       Assessment  Impairments: abnormal gait, abnormal or restricted ROM, activity intolerance, impaired balance, impaired physical strength, lacks appropriate home exercise program and pain with function   Functional limitations: lifting, sleeping, walking, uncomfortable because of pain, sitting, standing, stooping and unable to perform repetitive tasks   Assessment details: Yves comes to OPPT following a R knee dislocation, no surgery needed.  He is in a knee immobilizer at this time for another week, using crutches with WBAT.  He is limited on his ambulation, strength, ADLs, driving, and work duties. The skills of a therapist will be required to safely and effectively implement the following treatment  plan to restore maximal level of function.       Goals  Plan Goals: KNEE PROBLEMS:     1. The patient has limited ROM of the right knee.   LTG 1: 12 weeks:  The patient will demonstrate 0 to 120 degrees of ROM for the right knee in order to allow patient to perform ADLs and navigate stairs.    STATUS:  New   STG 1a: 4 weeks:  The patient will demonstrate 0 to 90 degrees of ROM for the right knee.    STATUS:  New   TREATMENT: Manual therapy, therapeutic exercise, home exercise instruction, and modalities as needed to include:  moist heat, electrical stimulation, ultrasound, and ice.    2. The patient has limited strength of the right knee.   LTG 2: 12 weeks: The patient will demonstrate 4+ /5 strength for right knee flexion and extension in order to allow patient improved joint stability    STATUS:  New   STG 2a: 4 weeks: The patient will demonstrate 4 /5 strength for right knee flexion and extension    STATUS:  New   STG2b:  4 weeks:  The patient will be independent with home exercises.     STATUS:  New   TREATMENT: Manual therapy, therapeutic exercise, home exercise instruction, aquatic therapy, and modalities as needed to include:  moist heat, electrical stimulation, ultrasound, and ice.     3. The patient has gait dysfunction.   LTG 3: 12 weeks:  The patient will ambulate without assistive device, independently, for community distances with minimal limp to the right lower extremity in order to improve mobility and allow patient to perform activities such as grocery shopping with greater ease.    STATUS:  New   TREATMENT: Gait training, aquatic therapy, therapeutic exercise, and home exercise instruction.    4. Mobility: Walking/Moving Around Functional Limitation     LTG 4: 12 weeks:  The patient will demonstrate decreased limitation by achieving a score of 50/80 on the LEFS.    STATUS:  New   STG 4 a: 4 weeks:  The patient will demonstrate decreased limitation by achieving a score of 30/80 on the LEFS.       STATUS:  New   TREATMENT:  Manual therapy, therapeutic exercise, home exercise instruction, and modalities as needed.      Plan  Therapy options: will be seen for skilled therapy services  Planned modality interventions: cryotherapy, dry needling, electrical stimulation/Russian stimulation, TENS, thermotherapy (hydrocollator packs) and ultrasound  Planned therapy interventions: ADL retraining, balance/weight-bearing training, body mechanics training, flexibility, functional ROM exercises, gait training, home exercise program, IADL retraining, joint mobilization, manual therapy, neuromuscular re-education, postural training, soft tissue mobilization, strengthening, stretching, therapeutic activities and abdominal trunk stabilization  Frequency: 2x week  Duration in weeks: 12  Treatment plan discussed with: patient  Plan details: Review HEP, update as needed.    Progress with R knee and BLE strength, trunk control/postural awareness, balance and gait training, coordination, increased stamina, decreased tightness, improved ROM/flexibility, education as needed.            History # of Personal Factors and/or Comorbidities: MODERATE (1-2)  Examination of Body System(s): # of elements: LOW (1-2)  Clinical Presentation: STABLE   Clinical Decision Making: LOW     Timed:  Manual Therapy:         mins  08690;  Therapeutic Exercise:    4     mins  55736;     Neuromuscular Lissette:       mins  33185;    Therapeutic Activity:          mins  96120;     Gait Trainin    mins  64388;     Ultrasound:                          mins  96093;  Self Care:           mins  96909          Un-timed:  Electrical Stimulation:         mins  60882 ( );  Dry Needling          mins self-pay;  Mechanical Traction           mins  20736;  Low Eval     20     mins 65563;  Moderate Eval          mins 64124;  High Eval          mins 88795;  Re-Eval          mins 03010;  Canalith Repos         mins 78745      Timed Treatment:   8   mins    Total Treatment:     28   mins        PT SIGNATURE: Navya Sargent PT, DPT  [place] License: [id number]  Electronically signed by Navya Sargent PT, 05/21/25, 8:58 AM EDT      Initial Certification    Certification Period: 5/21/2025 thru 8/18/2025  I certify that the therapy services are furnished while this patient is under my care.  The services outlined above are required by this patient, and will be reviewed every 90 days.     PHYSICIAN: Dameon Vargas MD  NPI: [id number]            PHYSICIAN PRINT NAME: ______________________________________________      PHYSICIAN SIGNATURE: ______________________________________________         DATE:________________________________        Please sign and return via fax to [phone].  Thank you, [facility].

## 2025-05-21 NOTE — TELEPHONE ENCOUNTER
Caller:  TANMAY   Relationship to Patient: PT   What form or medical record are you requesting: PROGNOTES FOR WORK COMP ADJ FROM 05/12/25   Phone Number: [phone]- FOR ADJ   How would you like to receive the form or medical records (pick-up, mail, fax): FAX   If fax, what is the fax number: [phone]

## 2025-05-28 ENCOUNTER — TELEPHONE (OUTPATIENT)
Dept: ORTHOPEDIC SURGERY | Facility: CLINIC | Age: 35
End: 2025-05-28
Payer: COMMERCIAL

## 2025-05-28 ENCOUNTER — TREATMENT (OUTPATIENT)
Dept: PHYSICAL THERAPY | Facility: CLINIC | Age: 35
End: 2025-05-28
Payer: OTHER MISCELLANEOUS

## 2025-05-28 DIAGNOSIS — R29.898 WEAKNESS OF RIGHT LEG: ICD-10-CM

## 2025-05-28 DIAGNOSIS — S83.004A PATELLAR DISLOCATION, RIGHT, INITIAL ENCOUNTER: Primary | ICD-10-CM

## 2025-05-28 DIAGNOSIS — R26.9 GAIT DISTURBANCE: ICD-10-CM

## 2025-05-28 DIAGNOSIS — M25.561 ACUTE PAIN OF RIGHT KNEE: ICD-10-CM

## 2025-05-28 DIAGNOSIS — M25.661 DECREASED RANGE OF MOTION (ROM) OF RIGHT KNEE: ICD-10-CM

## 2025-05-28 NOTE — TELEPHONE ENCOUNTER
MARTIN CUELLAR, PT WITH  [facility] ASKING FOR CLARIFICATION ON ROM RESTRICTIONS FOR PATIENT. SHE WOULD LIKE TO KNOW IF HE IS ABLE TO DO ANY ROM OR IF HE NEEDS TO STAY IN COMPLETE EXTENSION UNTIL IMMOBILIZER IS REMOVED.

## 2025-05-28 NOTE — PROGRESS NOTES
Outpatient Physical Therapy                   Physical Therapy Daily Treatment Note    Patient: Yves Cordova   : 1990  Diagnosis/ICD-10 Code:  Patellar dislocation, right, initial encounter [S83.004A]  Referring practitioner: Dameon Vargas MD  Date of Initial Visit: Type: THERAPY  Noted: 2025  Today's Date: 2025  Patient seen for 2 sessions             Subjective   Yves Cordova reports: he has MD appt on Wednesday about his shoulder pain.      Pain: 1/10 pain, at time of arrival located in his left knee. Pt states his right shoulder is hurting him more.     Objective     See Exercise, Manual, and Modality Logs for complete treatment.     Assessment/Plan  Pt arrived ambulating with a single crutch and knee immobilizer on. Yves is just beginning care to attend to deficits outlined in evaluation, requiring further therapist directed strengthening. Pt denies pain with exercises. Assess how patient tolerated treatment next session.    Progress per Plan of Care      Timed:  Manual Therapy:    0     mins  89434;  Therapeutic Exercise:    17     mins  42636;  Neuromuscular Lissette:    0    mins  99668;  Therapeutic Activity:     10     mins  38807;  Self care:                       0     mins  98963;  Gait Trainin     mins  49951;  Ultrasound:                    0     mins  98177;    Untimed:  Mechanical Traction:    0     mins  98450;  Electrical Stimulation:    0     mins  52057 ( );      Timed Treatment:   27   mins  Total Treatment:     27   mins      Electronically signed:   Binta Mcwilliams PTA  Physical Therapist Assistant  [facility] License #: [id number]

## 2025-06-02 ENCOUNTER — TREATMENT (OUTPATIENT)
Dept: PHYSICAL THERAPY | Facility: CLINIC | Age: 35
End: 2025-06-02
Payer: OTHER MISCELLANEOUS

## 2025-06-02 DIAGNOSIS — S83.004A PATELLAR DISLOCATION, RIGHT, INITIAL ENCOUNTER: Primary | ICD-10-CM

## 2025-06-02 DIAGNOSIS — M25.561 ACUTE PAIN OF RIGHT KNEE: ICD-10-CM

## 2025-06-02 DIAGNOSIS — R26.9 GAIT DISTURBANCE: ICD-10-CM

## 2025-06-02 DIAGNOSIS — R29.898 WEAKNESS OF RIGHT LEG: ICD-10-CM

## 2025-06-02 DIAGNOSIS — M25.661 DECREASED RANGE OF MOTION (ROM) OF RIGHT KNEE: ICD-10-CM

## 2025-06-02 PROCEDURE — 97530 THERAPEUTIC ACTIVITIES: CPT | Performed by: PHYSICAL THERAPIST

## 2025-06-02 PROCEDURE — 97140 MANUAL THERAPY 1/> REGIONS: CPT | Performed by: PHYSICAL THERAPIST

## 2025-06-02 PROCEDURE — 97110 THERAPEUTIC EXERCISES: CPT | Performed by: PHYSICAL THERAPIST

## 2025-06-02 NOTE — PROGRESS NOTES
Outpatient Physical Therapy                   Physical Therapy Daily Treatment Note    Patient: Yves Cordova   : 1990  Diagnosis/ICD-10 Code:  Patellar dislocation, right, initial encounter [S83.004A]  Referring practitioner: Dameon Vargas MD  Date of Initial Visit: Type: THERAPY  Noted: 2025  Today's Date: 2025  Patient seen for 3 sessions             Subjective   Yves Cordova reports: no new complaints.     Pain: 0/10 pain, at time of arrival.     Objective   L knee AROM:  Flexion: 102 degrees   Extension: 0 degrees     See Exercise, Manual, and Modality Logs for complete treatment.     Assessment/Plan  Pt progressed to heel slides with some pain. Pt states his pain is 4/10 at end range but decreases to 1/10 after. Pt was able to get 102 degrees of knee flexion actively. Pt would benefit from skilled PT to address Range of Motion  and Strength deficits, pain management and any concerns with ADLs.     Progress per Plan of Care      Timed:  Manual Therapy:    9     mins  01516;  Therapeutic Exercise:    9     mins  29551;  Neuromuscular Lissette:    0    mins  35648;  Therapeutic Activity:     8     mins  09664;  Self care:                       0     mins  71541;  Gait Trainin     mins  36603;  Ultrasound:                    0     mins  55850;    Untimed:  Mechanical Traction:    0     mins  01692;  Electrical Stimulation:    0     mins  84268 ( );      Timed Treatment:   26   mins  Total Treatment:     26   mins      Electronically signed:   Binta Mcwilliams PTA  Physical Therapist Assistant  [facility] License #: [id number]

## 2025-06-04 ENCOUNTER — TELEPHONE (OUTPATIENT)
Dept: ORTHOPEDIC SURGERY | Facility: CLINIC | Age: 35
End: 2025-06-04
Payer: OTHER MISCELLANEOUS

## 2025-06-04 ENCOUNTER — TREATMENT (OUTPATIENT)
Dept: PHYSICAL THERAPY | Facility: CLINIC | Age: 35
End: 2025-06-04
Payer: OTHER MISCELLANEOUS

## 2025-06-04 DIAGNOSIS — S83.004A PATELLAR DISLOCATION, RIGHT, INITIAL ENCOUNTER: Primary | ICD-10-CM

## 2025-06-04 DIAGNOSIS — M25.561 ACUTE PAIN OF RIGHT KNEE: ICD-10-CM

## 2025-06-04 DIAGNOSIS — R29.898 WEAKNESS OF RIGHT LEG: ICD-10-CM

## 2025-06-04 DIAGNOSIS — M25.661 DECREASED RANGE OF MOTION (ROM) OF RIGHT KNEE: ICD-10-CM

## 2025-06-04 DIAGNOSIS — R26.9 GAIT DISTURBANCE: ICD-10-CM

## 2025-06-04 PROCEDURE — 97110 THERAPEUTIC EXERCISES: CPT | Performed by: PHYSICAL THERAPIST

## 2025-06-04 PROCEDURE — 97140 MANUAL THERAPY 1/> REGIONS: CPT | Performed by: PHYSICAL THERAPIST

## 2025-06-04 PROCEDURE — 97530 THERAPEUTIC ACTIVITIES: CPT | Performed by: PHYSICAL THERAPIST

## 2025-06-04 NOTE — TELEPHONE ENCOUNTER
Hub staff attempted to follow warm transfer process and was unsuccessful     Caller: LETY    Relationship to patient: SELF    Best call back number: [phone]    Patient is needing: HAS NEW PROBLEM APPT 6/11/25 SHOULDER WANTS TO KNOW WHAT CAN HE DO FOR PAIN- WENT TO [facility] 5/19/25 AND GIVEN MUSCLE RELAXERS AND PAN MEDS BUT HE HAS TAKEN THEM ALL- WOULD LIKE TO BE SEEN SOONER- NO AVAILABILITY- PLEASE CALL

## 2025-06-04 NOTE — PROGRESS NOTES
Outpatient Physical Therapy                   Physical Therapy Daily Treatment Note    Patient: Yves Cordova   : 1990  Diagnosis/ICD-10 Code:  Patellar dislocation, right, initial encounter [S83.004A]  Referring practitioner: Dameon Vargas MD  Date of Initial Visit: Type: THERAPY  Noted: 2025  Today's Date: 2025  Patient seen for 4 sessions             Subjective   Yves Cordova reports: his right shoulder has really been hurting him; it kept him up all night last night. His brace is falling apart; his mom stapled the Velcro back together so it would be usable for today's visit or until they can get a new one. They have reached out to the MD's office trying to get more muscle relaxer's for his shoulder and to ask about the knee brace. Pt states he was sore the day following his last session.     Pain: 0/10 pain located in his right knee but 7/10 pain in the right shoulder at time of arrival.     Objective     See Exercise, Manual, and Modality Logs for complete treatment.     Assessment/Plan  Patient was unable to do hip adduction straight leg raises due to pain in the right shoulder when laying on the R side. Pt was able to progress on the bike today as his speed was increased; the screen turned on today and had a max of 16 RPM's. Pt would benefit from skilled PT to address Range of Motion and Strength deficits, pain management and any concerns with ADLs.       Progress per Plan of Care      Timed:  Manual Therapy:    8     mins  39279;  Therapeutic Exercise:    12     mins  33284;  Neuromuscular Lissette:    0    mins  98898;  Therapeutic Activity:     9     mins  37393;  Self care:                       0     mins  06567;  Gait Trainin     mins  16081;  Ultrasound:                    0     mins  75534;    Untimed:  Mechanical Traction:    0     mins  09127;  Electrical Stimulation:    0     mins  83035 ( );      Timed Treatment:   29   mins  Total Treatment:     29    mins      Electronically signed:   Binta Mcwilliams PTA  Physical Therapist Assistant  [facility] License #: [id number]

## 2025-06-09 ENCOUNTER — TREATMENT (OUTPATIENT)
Dept: PHYSICAL THERAPY | Facility: CLINIC | Age: 35
End: 2025-06-09
Payer: OTHER MISCELLANEOUS

## 2025-06-09 ENCOUNTER — OFFICE VISIT (OUTPATIENT)
Dept: ORTHOPEDIC SURGERY | Facility: CLINIC | Age: 35
End: 2025-06-09
Payer: OTHER MISCELLANEOUS

## 2025-06-09 VITALS
BODY MASS INDEX: 36.93 KG/M2 | SYSTOLIC BLOOD PRESSURE: 147 MMHG | HEIGHT: 70 IN | OXYGEN SATURATION: 97 % | WEIGHT: 257.94 LBS | DIASTOLIC BLOOD PRESSURE: 100 MMHG | HEART RATE: 106 BPM

## 2025-06-09 DIAGNOSIS — M23.321 DERANGEMENT OF POSTERIOR HORN OF MEDIAL MENISCUS OF RIGHT KNEE: ICD-10-CM

## 2025-06-09 DIAGNOSIS — M25.661 DECREASED RANGE OF MOTION (ROM) OF RIGHT KNEE: ICD-10-CM

## 2025-06-09 DIAGNOSIS — R29.898 WEAKNESS OF RIGHT LEG: ICD-10-CM

## 2025-06-09 DIAGNOSIS — R26.9 GAIT DISTURBANCE: ICD-10-CM

## 2025-06-09 DIAGNOSIS — S83.004D PATELLAR DISLOCATION, RIGHT, SUBSEQUENT ENCOUNTER: Primary | ICD-10-CM

## 2025-06-09 DIAGNOSIS — S83.004A PATELLAR DISLOCATION, RIGHT, INITIAL ENCOUNTER: Primary | ICD-10-CM

## 2025-06-09 DIAGNOSIS — S83.419A PARTIAL TEAR OF MEDIAL COLLATERAL LIGAMENT OF KNEE: ICD-10-CM

## 2025-06-09 DIAGNOSIS — M25.561 ACUTE PAIN OF RIGHT KNEE: ICD-10-CM

## 2025-06-09 PROCEDURE — 97110 THERAPEUTIC EXERCISES: CPT | Performed by: PHYSICAL THERAPIST

## 2025-06-09 PROCEDURE — 97140 MANUAL THERAPY 1/> REGIONS: CPT | Performed by: PHYSICAL THERAPIST

## 2025-06-09 PROCEDURE — 97530 THERAPEUTIC ACTIVITIES: CPT | Performed by: PHYSICAL THERAPIST

## 2025-06-09 RX ORDER — PSEUDOEPHED/ACETAMINOPH/DIPHEN 30MG-500MG
TABLET ORAL
COMMUNITY
Start: 2025-05-19

## 2025-06-09 RX ORDER — CYCLOBENZAPRINE HCL 10 MG
10 TABLET ORAL 3 TIMES DAILY PRN
COMMUNITY
Start: 2025-05-19

## 2025-06-09 NOTE — PROGRESS NOTES
Chief Complaint  Follow-up of the Right Knee     Subjective        History of Present Illness  The patient is a 35-year-old male here to follow up today on the right knee. He stepped on a shovel and felt a pop in his knee while at work on 05/02/2025.  This is a Workmen's Comp. case.  An initial evaluation was conducted on 05/09/2025, and a stat MRI was obtained. These results were reviewed with Dr. Vargas at his last visit. He was advised to continue using a knee immobilizer for 3 weeks but could bear weight as tolerated. An order was placed to begin physical therapy in 2 weeks, and he was instructed to continue over-the-counter pain relievers.  Physical therapy has been advised there is no range of motion restrictions and he is progressing that with them recently.      Patient reports today and states therapy is going well and is beneficial with [facility] in [place].  He notes an overall improvement in his pain levels except for some persistent discomfort that he attributes to the meniscus tear as he still has some medial joint line pain.  He continues with the knee immobilizer but has been full weightbearing without issue.   His therapy regimen includes cycling, with a noted increase in performance from 16 RPMs to 32 RPMs, so he definitely feels he is improving. He removes the brace during cycling sessions and stretching, but otherwise wears it during PT. He occasionally experiences hyperextension of the knee during walking, but this is not a frequent occurrence. 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, but acknowledges that this may not be possible at present and asks about restriction given his job is pretty physically demanding      Allergies   Allergen Reactions    Penicillins Hives        Social History     Socioeconomic History    Marital status: Single   Tobacco Use    Smoking status: Former     Current packs/day: 0.00     Average packs/day: 1 pack/day for  "12.0 years (12.0 ttl pk-yrs)     Types: Cigarettes     Start date: 5/10/2009     Quit date: 5/10/2021     Years since quittin.0     Passive exposure: Past    Smokeless tobacco: Never   Vaping Use    Vaping status: Never Used   Substance and Sexual Activity    Alcohol use: Never    Drug use: Never        Tobacco Use: Medium Risk (2025)    Patient History     Smoking Tobacco Use: Former     Smokeless Tobacco Use: Never     Passive Exposure: Past     Patient reports they have a history of tobacco use; encouraged continued tobacco cessation for further health benefits.     I reviewed the patient's chief complaint, history of present illness, review of systems, past medical history, surgical history, family history, social history, medications, and allergy list.     Review of Systems     Constitutional: Denies fevers, chills, weight loss  Cardiovascular: Denies chest pain, shortness of breath  Skin: Denies rashes, acute skin changes  Neurologic: Denies headache, loss of consciousness    Vital Signs:   /100   Pulse 106   Ht 177.8 cm (70\")   Wt 117 kg (257 lb 15 oz)   SpO2 97%   BMI 37.01 kg/m²          Physical Exam  General: Alert. No acute distress    Ortho Exam    General: Alert, no acute distress  Right lower extremity: Mild knee effusion.  Mild tenderness to the medial joint line.  No tenderness to the lateral joint line.  85 degrees of active flexion, passively 100 degrees limited by pain.  0 degrees extension.  Mild weakness in quad noted.  Knee extensor mechanism intact. Knee stable to varus and valgus stress. Calf soft, nontender. Demonstrates active ankle plantarflexion and dorsiflexion. Sensation intact over the dorsal and plantar foot.  Palpable pedal pulses.          Physical Exam        Result Review :       MRI Knee Right Without Contrast  Result Date: 2025  Narrative: MRI KNEE RIGHT  WO CONTRAST Date of Exam: 2025 12:51 PM EDT Indication: Right knee pain.  Comparison: Knee " radiographs 5/2/2025 Technique:  Routine multiplanar/multisequence images of the right knee were obtained without contrast administration. Findings: Medial compartment: Oblique linear signal along the posterior horn of the medial meniscus may reflect tear. Chondral surface irregularities without full thickness chondral loss. No evidence of subchondral edema. Lateral compartment: No evidence of meniscal tear. Articular cartilage is intact. No evidence of subchondral edema. Patellofemoral compartment: Lateral subluxation of the patella. Articular cartilage is intact. No evidence of subchondral edema. Medial plica is not present. Extensor mechanism: Patellar tendon is intact. Quadriceps tendon is intact. Ligaments: Anterior cruciate ligament is intact. Posterior cruciate ligament is intact. Low-grade partial tear of the proximal posterior fibers medial collateral ligament. Lateral collateral ligament complex is intact including the fibular collateral ligament, the biceps femoris tendon and the iliotibial band. There is irregularity and increased signal at the patellar attachment of the medial patellofemoral ligament suggestive of tear. Muscles and tendons: The popliteus muscle and tendon appear intact. No evidence of pes anserine bursitis. The remaining visualized muscles and tendons appear intact. Joint: Large knee joint effusion with synovitis. No Baker's cyst is seen. Bones: Contusion/impaction fracture of the lateral femoral condyle and medial patellar facet from prior transient lateral patellar dislocation. No suspicious marrow replacing lesions seen. Soft tissues: Neurovascular structures appear intact. Mild medial joint line edema. There is edema along the lateral and posterior subcutaneous soft tissues. No soft tissue masses or fluid collections seen.      Impression: Impression: 1.Contusion/impaction fracture of the lateral femoral condyle and medial patellar facet from prior transient lateral patellar  dislocation. 2.Irregularity and increased signal at the patellar attachment of the medial patellofemoral ligament suggestive of tear. 3.Low-grade partial tear of the proximal posterior fibers of the medial collateral ligament. 4.Oblique linear signal along the posterior horn of the medial meniscus may reflect tear. 5.Large knee joint effusion with synovitis. Electronically Signed: Seth Ceballos MD  5/9/2025 2:36 PM EDT  Workstation ID: [id number]           Assessment and Plan     Diagnoses and all orders for this visit:    1. Patellar dislocation, right, subsequent encounter (Primary)  -     Ambulatory Referral to Physical Therapy for Evaluation & Treatment    2. Posterior horn medial meniscus tear, and partial medial patellofemoral ligament tear    3. Partial tear of medial collateral ligament of knee        Assessment & Plan  Knee immobilizer discontinued.    Transition to lateral J brace to utilize primarily with activity.    We discussed return to work.  Advised no pivoting shifting sidestepping kneeling/squatting etc.  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  Limited lifting.    Continue progressive range of motion with PT and home exercises.  His flexion does seem to be a bit worse than last visit compared to last notes, however he definitely feels improvement with PT.  PT order updated.    He is seeing Dr. Vargas on Wednesday for the shoulder, which he states is bothering him more than the knee at this point.    Patient will follow-up in 1 month for the right knee.  No x-rays.    Patient or patient representative verbalized consent for the use of Ambient Listening during the visit with  Ashly Davis PA-C for chart documentation. 6/11/2025  12:57 EDT    Follow Up   There are no Patient Instructions on file for this visit.        Patient was given instructions and counseling regarding his condition or for health maintenance advice. Please see specific information pulled into  the AVS if appropriate.     Ashly Davis PA-C   06/09/2025  20:27 EDT        Dictated Utilizing Dragon Dictation. Please note that portions of this note were completed with a voice recognition program. Part of this note may be an electronic transcription/translation of spoken language to printed text using the Dragon Dictation System.

## 2025-06-09 NOTE — PROGRESS NOTES
"Outpatient Physical Therapy                   Physical Therapy Daily Treatment Note    Patient: Yves Cordova   : 1990  Diagnosis/ICD-10 Code:  Patellar dislocation, right, initial encounter [S83.004A]  Referring practitioner: Dameon Vargas MD  Date of Initial Visit: Type: THERAPY  Noted: 2025  Today's Date: 2025  Patient seen for 5 sessions             Subjective   Yves Cordova reports: he hasn't been able to ask his doctor about the brace (\"it is falling apart\" and been fixed with staples at home) Pt states he has a follow up for his knee later today.     Pain: 2/10 pain, at time of arrival located in the right knee. His shoulder is feeling better today compared to last time he was here. Pt states his knee hurt quite a bit yesterday due to sleeping on it wrong.     Objective   R knee PROM flexion: 104 degrees with 7-8/10 pain at its max.     See Exercise, Manual, and Modality Logs for complete treatment.     Assessment/Plan  Yves demonstrated good tolerance to all functional lower strengthening. Pt has increased pain with knee flexion but it reduces once positioning is changed. Pt states his pain is 2/10 at the end of the session. Continue to progress with current PT plan of care per patient tolerance.       Progress per Plan of Care      Timed:  Manual Therapy:    8     mins  95138;  Therapeutic Exercise:    12     mins  64831;  Neuromuscular Lissette:    0    mins  86896;  Therapeutic Activity:     9     mins  58332;  Self care:                       0     mins  14333;  Gait Trainin     mins  20353;  Ultrasound:                    0     mins  46115;    Untimed:  Mechanical Traction:    0     mins  17239;  Electrical Stimulation:    0     mins  59791 ( );      Timed Treatment:   29   mins  Total Treatment:     29   mins      Electronically signed:   Binta Mcwilliams PTA  Physical Therapist Assistant  [facility] License #: [id number]  "

## 2025-06-11 ENCOUNTER — TREATMENT (OUTPATIENT)
Dept: PHYSICAL THERAPY | Facility: CLINIC | Age: 35
End: 2025-06-11
Payer: OTHER MISCELLANEOUS

## 2025-06-11 ENCOUNTER — OFFICE VISIT (OUTPATIENT)
Dept: ORTHOPEDIC SURGERY | Facility: CLINIC | Age: 35
End: 2025-06-11
Payer: OTHER MISCELLANEOUS

## 2025-06-11 VITALS
BODY MASS INDEX: 36.79 KG/M2 | OXYGEN SATURATION: 97 % | WEIGHT: 257 LBS | SYSTOLIC BLOOD PRESSURE: 154 MMHG | DIASTOLIC BLOOD PRESSURE: 108 MMHG | HEART RATE: 92 BPM | HEIGHT: 70 IN

## 2025-06-11 DIAGNOSIS — R29.898 WEAKNESS OF RIGHT LEG: ICD-10-CM

## 2025-06-11 DIAGNOSIS — S83.004A PATELLAR DISLOCATION, RIGHT, INITIAL ENCOUNTER: Primary | ICD-10-CM

## 2025-06-11 DIAGNOSIS — M25.511 RIGHT SHOULDER PAIN, UNSPECIFIED CHRONICITY: Primary | ICD-10-CM

## 2025-06-11 DIAGNOSIS — R26.9 GAIT DISTURBANCE: ICD-10-CM

## 2025-06-11 DIAGNOSIS — M25.561 ACUTE PAIN OF RIGHT KNEE: ICD-10-CM

## 2025-06-11 DIAGNOSIS — M25.661 DECREASED RANGE OF MOTION (ROM) OF RIGHT KNEE: ICD-10-CM

## 2025-06-11 DIAGNOSIS — S49.91XA RIGHT SHOULDER INJURY, INITIAL ENCOUNTER: ICD-10-CM

## 2025-06-11 RX ORDER — DICLOFENAC SODIUM 75 MG/1
75 TABLET, DELAYED RELEASE ORAL 2 TIMES DAILY
Qty: 60 TABLET | Refills: 0 | Status: SHIPPED | OUTPATIENT
Start: 2025-06-11

## 2025-06-11 NOTE — PROGRESS NOTES
"Chief Complaint  Initial Evaluation of the Right Shoulder     Subjective      Yves Cordova presents to [facility] for follow up of the right knee.  He stepped on a shovel and felt a pop in his knee while at work on 2025.  This is a Workmen's Comp. case. He was using crutches due to the patella dislocation. He has had pain since then in the shoulder with forward and upward ROM.  He had no problems with the shoulder prior to that.      Allergies   Allergen Reactions    Penicillins Hives        Social History     Socioeconomic History    Marital status: Single   Tobacco Use    Smoking status: Former     Current packs/day: 0.00     Average packs/day: 1 pack/day for 12.0 years (12.0 ttl pk-yrs)     Types: Cigarettes     Start date: 5/10/2009     Quit date: 5/10/2021     Years since quittin.0     Passive exposure: Past    Smokeless tobacco: Never   Vaping Use    Vaping status: Never Used   Substance and Sexual Activity    Alcohol use: Never    Drug use: Never        I reviewed the patient's chief complaint, history of present illness, review of systems, past medical history, surgical history, family history, social history, medications, and allergy list.     Review of Systems     Constitutional: Denies fevers, chills, weight loss  Cardiovascular: Denies chest pain, shortness of breath  Skin: Denies rashes, acute skin changes  Neurologic: Denies headache, loss of consciousness      Vital Signs:   BP (!) 154/108 Comment: provider aware. Advised to call PCP  Pulse 92   Ht 177.8 cm (70\")   Wt 117 kg (257 lb)   SpO2 97%   BMI 36.88 kg/m²          Physical Exam  General: Alert. No acute distress    Ortho Exam        RIGHT SHOULDER Full ROM of the shoulder with pain at end ROM.  Negative Cross body adduction. Supraspinatus strength 5/5. Infraspinatus Strength 5/5. Infrared subscap 5/5. Positive Hills. Positive Neer. Negative Apprehension. Negative Lift off. (Negative Obriens. " Sensation intact to light touch, median, radial, ulnar nerve. Positive AIN, PIN, ulnar nerve motor. Positive pulses. Positive Impingement signs. . Tender to palpation to  the lateral aspect of the shoulder.        Procedures        Imaging Results (Most Recent)       Procedure Component Value Units Date/Time    XR Scapula Right - In process [[id number]] Resulted: 06/11/25 1445     Updated: 06/11/25 1446    This result has not been signed. Information might be incomplete.               Result Review :       X-Ray Report:  Right scapula X-Ray  Indication: Evaluation of the right scapula  AP/Lateral view(s)  Findings: No acute fracture or dislocation.    Prior studies available for comparison: no        Assessment and Plan     Diagnoses and all orders for this visit:    1. Right shoulder pain, unspecified chronicity (Primary)  -     XR Scapula Right    2. Right shoulder injury, initial encounter        Discussed the treatment plan with the patient. I reviewed the X-rays that were obtained today with the patient.     Prescribed anti inflammatory.      If pain persists I will offer a right shoulder steroid injection.      Call or return if worsening symptoms.    Follow Up     PRN      Patient was given instructions and counseling regarding his condition or for health maintenance advice. Please see specific information pulled into the AVS if appropriate.     Scribed for Dameon Vargas MD by Sonia Ivy MA.  06/11/25   14:45 EDT    I have personally performed the services described in this document as scribed by the above individual and it is both accurate and complete. Dameon Vargas MD 06/11/25

## 2025-06-11 NOTE — PROGRESS NOTES
"Outpatient Physical Therapy                   Physical Therapy Daily Treatment Note    Patient: Yves Cordova   : 1990  Diagnosis/ICD-10 Code:  Patellar dislocation, right, initial encounter [S83.004A]  Referring practitioner: Dameon Vargas MD  Date of Initial Visit: Type: THERAPY  Noted: 2025  Today's Date: 2025  Patient seen for 6 sessions             Subjective   Yves Cordova reports: he went to the doctor on Monday for a follow up on his knee. He was given a soft hinged brace to start wearing with activity and returns to work on light duty as of 2025. 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     Pain: \"at most\" 2/10 pain, located in the right knee.     Objective   R knee AROM with pain: 115 degrees     See Exercise, Manual, and Modality Logs for complete treatment.     Assessment/Plan  Yves demonstrated good tolerance to all functional lower strengthening. Continue to progress with current PT plan of care per patient tolerance.       Progress per Plan of Care      Timed:  Manual Therapy:    8     mins  27217;  Therapeutic Exercise:    12     mins  44318;  Neuromuscular Lissette:    0    mins  00292;  Therapeutic Activity:     8     mins  52158;  Self care:                       0     mins  14540;  Gait Trainin     mins  83693;  Ultrasound:                    0     mins  10073;    Untimed:  Mechanical Traction:    0     mins  58710;  Electrical Stimulation:    0     mins  04847 ( );      Timed Treatment:   28   mins  Total Treatment:     28   mins      Electronically signed:   Binta Mcwilliams PTA  Physical Therapist Assistant  \[facility]\"" License #: [id number]  "

## 2025-06-16 ENCOUNTER — TREATMENT (OUTPATIENT)
Dept: PHYSICAL THERAPY | Facility: CLINIC | Age: 35
End: 2025-06-16
Payer: OTHER MISCELLANEOUS

## 2025-06-16 DIAGNOSIS — M25.561 ACUTE PAIN OF RIGHT KNEE: ICD-10-CM

## 2025-06-16 DIAGNOSIS — R26.9 GAIT DISTURBANCE: ICD-10-CM

## 2025-06-16 DIAGNOSIS — M25.661 DECREASED RANGE OF MOTION (ROM) OF RIGHT KNEE: ICD-10-CM

## 2025-06-16 DIAGNOSIS — R29.898 WEAKNESS OF RIGHT LEG: ICD-10-CM

## 2025-06-16 DIAGNOSIS — S83.004A PATELLAR DISLOCATION, RIGHT, INITIAL ENCOUNTER: Primary | ICD-10-CM

## 2025-06-16 PROCEDURE — 97112 NEUROMUSCULAR REEDUCATION: CPT | Performed by: PHYSICAL THERAPIST

## 2025-06-16 PROCEDURE — 97140 MANUAL THERAPY 1/> REGIONS: CPT | Performed by: PHYSICAL THERAPIST

## 2025-06-16 PROCEDURE — 97530 THERAPEUTIC ACTIVITIES: CPT | Performed by: PHYSICAL THERAPIST

## 2025-06-16 PROCEDURE — 97110 THERAPEUTIC EXERCISES: CPT | Performed by: PHYSICAL THERAPIST

## 2025-06-16 NOTE — PROGRESS NOTES
Re-Assessment / Re-Certification  [facility], [address], [place] [address]      Patient: Yves Cordova   : 1990  Diagnosis/ICD-10 Code:  Patellar dislocation, right, initial encounter [S83.004A]  Referring practitioner: Dameon Vargas MD  Date of Initial Visit: Type: THERAPY  Noted: 2025  Today's Date: 2025  Patient seen for 7 sessions      Subjective:     Visit Diagnoses:    ICD-10-CM ICD-9-CM   1. Patellar dislocation, right, initial encounter  S83.004A 836.3   2. Acute pain of right knee  M25.561 719.46   3. Decreased range of motion (ROM) of right knee  M25.661 719.56   4. Weakness of right leg  R29.898 729.89   5. Gait disturbance  R26.9 781.2       Clinical Progress: improved  Home Program Compliance: Yes  Treatment has included: therapeutic exercise, neuromuscular re-education, manual therapy, therapeutic activity, and gait training      Subjective Evaluation    History of Present Illness  Mechanism of injury: Patient's pain today is 1/10, just soreness from getting in/out car.     He saw ortho last week.  He is going back to work for seated work and riding mower only.  No pivoting, squatting, lifting >15 lbs.     Overall, patient feels that PT has been helpful for him. He notes an improvement of 40-50% since starting therapy.    He is doing his HEP daily.     He doesn't know if he could run, not sure he could jump at all.  Walking is still a little hard for him, not quite there with walking he says.  He hasn't drove yet.  He doesn't think he could drive his car with having to bend his knee up too far.  Stairs he can do, but just take them one at a time.     Returns back to the doctor 25.           Objective           General Comments     Knee Comments  R knee AROM  Flexion: 110 AROM, 117 degrees PROM  Extension: 0 degrees    No extensor lag with quad set and SLR    R knee MMT  Flexion: 4-/5  Extension: 4-/5, pressure at joint line    R hip MMT   Flexion:  4/5  Extension: /5  Abduction: 4/5  Adduction: 4+ /5    WB Status: WBAT and patient is no AD, he is wearing a soft hinged brace with activity/work.     Swelling: none noted    Radicular Symptoms: none noted    Tenderness: R knee joint line, more medially, top/bottom patella on the R       Assessment & Plan       Assessment  Impairments: abnormal gait, abnormal or restricted ROM, activity intolerance, impaired balance, impaired physical strength, lacks appropriate home exercise program and pain with function   Functional limitations: lifting, sleeping, walking, uncomfortable because of pain, sitting, standing, stooping and unable to perform repetitive tasks   Assessment details: Yves is progressing with the R knee in therapy.  His ROM and strength are both starting to improve.  He went back to ortho on 6/9/25, follows back up on 7/11/25.  He is now wearing a soft hinged brace with activity.  He will return to light duty work on 6/23/25, primarily riding a mower.  LEFS has improved with overall functional skills.  Progressing with all goals.   Patient will continue to benefit from further PT services to address their remaining deficits noted and progress to achieve their goals.       Goals  Plan Goals: KNEE PROBLEMS:     1. The patient has limited ROM of the right knee.   LTG 1: 12 weeks:  The patient will demonstrate 0 to 120 degrees of ROM for the right knee in order to allow patient to perform ADLs and navigate stairs.    STATUS:  Progressing   STG 1a: 4 weeks:  The patient will demonstrate 0 to 90 degrees of ROM for the right knee.    STATUS:  MET   TREATMENT: Manual therapy, therapeutic exercise, home exercise instruction, and modalities as needed to include:  moist heat, electrical stimulation, ultrasound, and ice.    2. The patient has limited strength of the right knee.   LTG 2: 12 weeks: The patient will demonstrate 4+ /5 strength for right knee flexion and extension in order to allow patient improved joint  stability    STATUS:  Progressing   STG 2a: 4 weeks: The patient will demonstrate 4 /5 strength for right knee flexion and extension    STATUS:  Progressing  STG2b:  4 weeks:  The patient will be independent with home exercises.     STATUS:  Progressing   TREATMENT: Manual therapy, therapeutic exercise, home exercise instruction, aquatic therapy, and modalities as needed to include:  moist heat, electrical stimulation, ultrasound, and ice.     3. The patient has gait dysfunction.   LTG 3: 12 weeks:  The patient will ambulate without assistive device, independently, for community distances with minimal limp to the right lower extremity in order to improve mobility and allow patient to perform activities such as grocery shopping with greater ease.    STATUS:  Progressing   TREATMENT: Gait training, aquatic therapy, therapeutic exercise, and home exercise instruction.    4. Mobility: Walking/Moving Around Functional Limitation     LTG 4: 12 weeks:  The patient will demonstrate decreased limitation by achieving a score of 50/80 on the LEFS.    STATUS:  Progressing   STG 4 a: 4 weeks:  The patient will demonstrate decreased limitation by achieving a score of 30/80 on the LEFS.      STATUS:  Progressing   TREATMENT:  Manual therapy, therapeutic exercise, home exercise instruction, and modalities as needed.      Plan  Therapy options: will be seen for skilled therapy services  Planned modality interventions: cryotherapy, dry needling, electrical stimulation/Russian stimulation, TENS, thermotherapy (hydrocollator packs) and ultrasound  Planned therapy interventions: ADL retraining, balance/weight-bearing training, body mechanics training, flexibility, functional ROM exercises, gait training, home exercise program, IADL retraining, joint mobilization, manual therapy, neuromuscular re-education, postural training, soft tissue mobilization, strengthening, stretching, therapeutic activities and abdominal trunk  stabilization  Frequency: 2x week  Duration in weeks: 8  Treatment plan discussed with: patient  Plan details: Progress with R knee and BLE strength, trunk control/postural awareness, balance and gait training, coordination, increased stamina, decreased tightness, improved ROM/flexibility, education as needed.          Progress toward previous goals: Partially Met      Recommendations: Continue as planned  Timeframe: 2 months  Prognosis to achieve goals: good    Timed:  Manual Therapy:    8     mins  42220;  Therapeutic Exercise:    10     mins  40290;     Neuromuscular Lissette:   8    mins  05624;    Therapeutic Activity:     8     mins  88044;     Gait Training:           mins  95698;     Ultrasound:                          mins  31795;  Self Care:           mins  82538          Un-timed:  Electrical Stimulation:         mins  30022 ( );  Dry Needling          mins self-pay;  Mechanical Traction           mins  54958;  Low Eval          mins 39877;  Moderate Eval          mins 02582;  High Eval          mins 21459;  Re-Eval          mins 80506;  Canalith Repos         mins 95476      Timed Treatment:  34   mins   Total Treatment:     34   mins        PT SIGNATURE: Navya Sargent PT, DPT  [place] License: [id number]     Certification Period: 6/16/2025 thru 9/13/2025  I certify that the therapy services are furnished while this patient is under my care.  The services outlined above are required by this patient, and will be reviewed every 90 days.     PHYSICIAN: Dameon Vargas MD  NPI: [id number]      PHYSICIAN PRINT NAME: ______________________________________________      PHYSICIAN SIGNATURE: ______________________________________________         DATE:________________________________        Please sign and return via fax to [phone].  Thank you, [facility].

## 2025-06-18 ENCOUNTER — TREATMENT (OUTPATIENT)
Dept: PHYSICAL THERAPY | Facility: CLINIC | Age: 35
End: 2025-06-18
Payer: OTHER MISCELLANEOUS

## 2025-06-18 DIAGNOSIS — R29.898 WEAKNESS OF RIGHT LEG: ICD-10-CM

## 2025-06-18 DIAGNOSIS — S83.004A PATELLAR DISLOCATION, RIGHT, INITIAL ENCOUNTER: Primary | ICD-10-CM

## 2025-06-18 DIAGNOSIS — M25.661 DECREASED RANGE OF MOTION (ROM) OF RIGHT KNEE: ICD-10-CM

## 2025-06-18 DIAGNOSIS — M25.561 ACUTE PAIN OF RIGHT KNEE: ICD-10-CM

## 2025-06-18 DIAGNOSIS — R26.9 GAIT DISTURBANCE: ICD-10-CM

## 2025-06-18 NOTE — PROGRESS NOTES
Outpatient Physical Therapy                   Physical Therapy Daily Treatment Note    Patient: Yves Cordova   : 1990  Diagnosis/ICD-10 Code:  Patellar dislocation, right, initial encounter [S83.004A]  Referring practitioner: Dameon Vargas MD  Date of Initial Visit: Type: THERAPY  Noted: 2025  Today's Date: 2025  Patient seen for 8 sessions             Subjective   Yves Cordova reports: he was a little sore yesterday from last session. Pt notes his LLE feels a little more unstable today than normal.     Pain: 0/10 pain, at time of arrival.     Objective     See Exercise, Manual, and Modality Logs for complete treatment.     Assessment/Plan  Yves demonstrated good tolerance to all functional lower strengthening. Continue to progress with current PT plan of care per patient tolerance.       Progress per Plan of Care      Timed:  Manual Therapy:    8     mins  46008;  Therapeutic Exercise:    8     mins  82852;  Neuromuscular Lissette:    8    mins  92259;  Therapeutic Activity:     0     mins  14659;  Self care:                       0     mins  76654;  Gait Trainin     mins  70538;  Ultrasound:                    0     mins  83973;    Untimed:  Mechanical Traction:    0     mins  56303;  Electrical Stimulation:    0     mins  73689 ( );      Timed Treatment:   24   mins  Total Treatment:     24   mins      Electronically signed:   Binta Mcwilliams PTA  Physical Therapist Assistant  [facility] License #: [id number]

## 2025-06-24 ENCOUNTER — TELEPHONE (OUTPATIENT)
Dept: ORTHOPEDIC SURGERY | Facility: CLINIC | Age: 35
End: 2025-06-24

## 2025-06-24 NOTE — TELEPHONE ENCOUNTER
DELETE AFTER REVIEWING: Send this encounter to the appropriate pool. See your Call Action Grid or Workflows for direction.    Caller: Yves Cordova    Relationship: Self    Best call back number: There are no phone numbers on file. [phone]        What form or medical record are you requesting: FITNESS FOR [employer] CERTIFICATION FROM PATIENT'S HR     Who is requesting this form or medical record from you: PATIENT'S EMPLOYER/HR     How would you like to receive the form or medical records (pick-up, mail, fax): PATIENT WOULD LIKE IT FAXED BACK   If fax, what is the fax number: PATIENT PROVIDED THE FOLLOWING NUMBER FOR [phone]    Timeframe paperwork needed: ASAP     Additional notes: PATIENT CALLED TO CONFIRM RECEIPT OF THE FORM THAT IS TO BE FILLED OUT ON HIS BEHALF FROM  HIS EMPLOYER.

## 2025-06-25 ENCOUNTER — TREATMENT (OUTPATIENT)
Dept: PHYSICAL THERAPY | Facility: CLINIC | Age: 35
End: 2025-06-25
Payer: OTHER MISCELLANEOUS

## 2025-06-25 ENCOUNTER — TELEPHONE (OUTPATIENT)
Dept: GASTROENTEROLOGY | Facility: CLINIC | Age: 35
End: 2025-06-25
Payer: OTHER MISCELLANEOUS

## 2025-06-25 DIAGNOSIS — M25.561 ACUTE PAIN OF RIGHT KNEE: ICD-10-CM

## 2025-06-25 DIAGNOSIS — R29.898 WEAKNESS OF RIGHT LEG: ICD-10-CM

## 2025-06-25 DIAGNOSIS — R26.9 GAIT DISTURBANCE: ICD-10-CM

## 2025-06-25 DIAGNOSIS — S83.004A PATELLAR DISLOCATION, RIGHT, INITIAL ENCOUNTER: Primary | ICD-10-CM

## 2025-06-25 DIAGNOSIS — M25.661 DECREASED RANGE OF MOTION (ROM) OF RIGHT KNEE: ICD-10-CM

## 2025-06-25 NOTE — PROGRESS NOTES
Outpatient Physical Therapy                   Physical Therapy Daily Treatment Note    Patient: Yves Cordova   : 1990  Diagnosis/ICD-10 Code:  Patellar dislocation, right, initial encounter [S83.004A]  Referring practitioner: Dameon Vargas MD  Date of Initial Visit: Type: THERAPY  Noted: 2025  Today's Date: 2025  Patient seen for 9 sessions             Subjective   Yves Cordova reports: he wasn't able to go back to work on Monday because his employer is still waiting on paperwork from [place]. He is waiting to hear from them.     Pain: 0/10 pain, at time of arrival.     Objective     See Exercise, Manual, and Modality Logs for complete treatment.     Assessment/Plan  Yves demonstrated good tolerance to all functional lower strengthening. Pt was challenged by walking marches due to the balance aspect. He was able to safely complete them with a few occurences of stepping strategy. Continue to progress with current PT plan of care per patient tolerance.     Progress per Plan of Care      Timed:  Manual Therapy:    8     mins  66413;  Therapeutic Exercise:    12     mins  49261;  Neuromuscular Lissette:    0    mins  09022;  Therapeutic Activity:     8     mins  18353;  Self care:                       0     mins  87383;  Gait Trainin     mins  17948;  Ultrasound:                    0     mins  67029;    Untimed:  Mechanical Traction:    0     mins  09850;  Electrical Stimulation:    0     mins  25694 ( );      Timed Treatment:   28   mins  Total Treatment:     28   mins      Electronically signed:   Binta Mcwilliams PTA  Physical Therapist Assistant  [facility] License #: [id number]

## 2025-06-25 NOTE — TELEPHONE ENCOUNTER
Yves Cordova  1990     Patient requested to reschedule their Colonoscopy. I have offered to reschedule this patient and patient has agreed. Patient has been rescheduled to 08/07/2025 at 11:30 am.    Reason for rescheduling: missed procedure     Original date of case request: 05/13/2025    This procedure was ordered by HAROON Wade for an important reason. I have thoroughly discussed with the patient that there are risks associated with not proceeding with the procedure at this time such as a delay in diagnosis, risk of incurable disease, or cancer. Patient verbalized understanding the risks discussed.    Patient plans to call us back to reschedule: No    Updated clearance needed?: No    Is there a follow up appointment that needs to be rescheduled after the procedure date? No - Patient's office visit has been rescheduled to 10/07/2025.    If yes, clearance request has been submitted to: Provider Name    Is the patient currently on any injectable medications for weight loss or diabetes? No    If yes, are they aware that they will need to discontinue this 7 days prior to their procedure? No    Has endo scheduling been notified? Yes    If yes, who did you speak to? Mabel    Has the case request been updated? Yes

## 2025-07-02 ENCOUNTER — TREATMENT (OUTPATIENT)
Dept: PHYSICAL THERAPY | Facility: CLINIC | Age: 35
End: 2025-07-02
Payer: OTHER MISCELLANEOUS

## 2025-07-02 DIAGNOSIS — R26.9 GAIT DISTURBANCE: ICD-10-CM

## 2025-07-02 DIAGNOSIS — S83.004A PATELLAR DISLOCATION, RIGHT, INITIAL ENCOUNTER: Primary | ICD-10-CM

## 2025-07-02 DIAGNOSIS — M25.561 ACUTE PAIN OF RIGHT KNEE: ICD-10-CM

## 2025-07-02 DIAGNOSIS — M25.661 DECREASED RANGE OF MOTION (ROM) OF RIGHT KNEE: ICD-10-CM

## 2025-07-02 DIAGNOSIS — R29.898 WEAKNESS OF RIGHT LEG: ICD-10-CM

## 2025-07-02 NOTE — PROGRESS NOTES
Outpatient Physical Therapy                   Physical Therapy Daily Treatment Note    Patient: Yves Cordova   : 1990  Diagnosis/ICD-10 Code:  Patellar dislocation, right, initial encounter [S83.004A]  Referring practitioner: Dameon Vargas MD  Date of Initial Visit: Type: THERAPY  Noted: 2025  Today's Date: 2025  Patient seen for 10 sessions             Subjective   Yves Cordova reports: he went back to work yesterday. Pt states he has been on a riding mower most of the day. He has had some pain at work so he is taking breaks every two hours or as needed per MD instructions.     Pain: 0/10 pain, at time of arrival. His leg has been very tired today.     Objective   R knee ROM:       Active before manual: 3-111 deg       Passive: 0-120      Active after manual: 2-115    See Exercise, Manual, and Modality Logs for complete treatment.     Assessment/Plan  Yves demonstrated good tolerance to all functional lower strengthening. Pt had some edema above and on the lateral side of his knee today. Instruction given to increase use of ice as needed. Continue to progress with current PT plan of care per patient tolerance.       Progress per Plan of Care      Timed:  Manual Therapy:    8     mins  32376;  Therapeutic Exercise:    23     mins  80865;  Neuromuscular Lissette:    0    mins  38175;  Therapeutic Activity:     8     mins  93268;  Self care:                       0     mins  04010;  Gait Trainin     mins  48732;  Ultrasound:                    0     mins  14595;    Untimed:  Mechanical Traction:    0     mins  56938;  Electrical Stimulation:    0     mins  53366 ( );      Timed Treatment:   39   mins  Total Treatment:     39   mins      Electronically signed:   Binta Mcwilliams PTA  Physical Therapist Assistant  [facility] License #: [id number]

## 2025-07-10 ENCOUNTER — TREATMENT (OUTPATIENT)
Dept: PHYSICAL THERAPY | Facility: CLINIC | Age: 35
End: 2025-07-10
Payer: OTHER MISCELLANEOUS

## 2025-07-10 DIAGNOSIS — S83.004A PATELLAR DISLOCATION, RIGHT, INITIAL ENCOUNTER: Primary | ICD-10-CM

## 2025-07-10 DIAGNOSIS — M25.661 DECREASED RANGE OF MOTION (ROM) OF RIGHT KNEE: ICD-10-CM

## 2025-07-10 DIAGNOSIS — R29.898 WEAKNESS OF RIGHT LEG: ICD-10-CM

## 2025-07-10 DIAGNOSIS — R26.9 GAIT DISTURBANCE: ICD-10-CM

## 2025-07-10 DIAGNOSIS — M25.561 ACUTE PAIN OF RIGHT KNEE: ICD-10-CM

## 2025-07-10 NOTE — PROGRESS NOTES
Physical Therapy Daily Treatment Note  [place] [facility], [address], [place] [address]      Patient: Yves Cordova   : 1990  Referring practitioner: Dameon Vargas MD  Date of Initial Visit: Type: THERAPY  Noted: 2025  Today's Date: 7/10/2025  Patient seen for 11 sessions           Subjective Evaluation    History of Present Illness    Subjective comment: 2/10 on and off on the medial aspect of the R knee.       Objective   See Exercise, Manual, and Modality Logs for complete treatment.       Assessment & Plan       Assessment  Assessment details: Pt is having low pain at this tx visit and points to the area of the medial aspect of the R knee as the source of pain. There is not indication of not being able to perform any of the exercises but there was a slight discomfort in the face initially with the rocker board. Pt did state that it did not hurt. Several balance activities were used to help facilitate fine motor and stabilization.          Visit Diagnoses:    ICD-10-CM ICD-9-CM   1. Patellar dislocation, right, initial encounter  S83.004A 836.3   2. Acute pain of right knee  M25.561 719.46   3. Decreased range of motion (ROM) of right knee  M25.661 719.56   4. Weakness of right leg  R29.898 729.89   5. Gait disturbance  R26.9 781.2       Progress per Plan of Care    Timed:    Therapeutic Exercise:    10     mins  05981;  Manual Therapy:         mins  77833;     Neuromuscular Lissette:   18    mins  36012;    Therapeutic Activity:     12     mins  24276;     Gait Training:           mins  84088;     Ultrasound:          mins  53556;      Untimed:  Electrical Stimulation:         mins  37844 ( );  Mechanical Traction:         mins  99983;   Group           mins  70023    Timed Treatment:   40   mins   Total Treatment:     42   mins      Ria Becerra PTA  Physical Therapist Assistant

## 2025-07-11 ENCOUNTER — OFFICE VISIT (OUTPATIENT)
Dept: ORTHOPEDIC SURGERY | Facility: CLINIC | Age: 35
End: 2025-07-11
Payer: OTHER MISCELLANEOUS

## 2025-07-11 VITALS
WEIGHT: 257 LBS | OXYGEN SATURATION: 96 % | HEIGHT: 70 IN | SYSTOLIC BLOOD PRESSURE: 138 MMHG | DIASTOLIC BLOOD PRESSURE: 84 MMHG | HEART RATE: 62 BPM | BODY MASS INDEX: 36.79 KG/M2

## 2025-07-11 DIAGNOSIS — S83.004D PATELLAR DISLOCATION, RIGHT, SUBSEQUENT ENCOUNTER: Primary | ICD-10-CM

## 2025-07-11 DIAGNOSIS — M23.321 DERANGEMENT OF POSTERIOR HORN OF MEDIAL MENISCUS OF RIGHT KNEE: ICD-10-CM

## 2025-07-11 NOTE — PROGRESS NOTES
Chief Complaint  Follow-up of the Right Knee     Subjective    History of Present Illness  The patient is a 35-year-old male who presents for a follow-up on his right knee. He stepped on a shovel while at work and felt a pop on 05/02/2025. This is a workmen's comp case.     He had an initial evaluation with Dr. Vargas on 05/09/2025, during which a stat MRI was obtained. The MRI showed an oblique tear of the posterior horn medial meniscus or possible oblique tear of the medial meniscus: Lateral subluxation of the patella, a low-grade partial tear of the MCL, and some suggestions of possible tearing of the patellar attachment of the MPFL. Additionally, there was a contusion/impaction fracture of the lateral femoral condyle and medial patellar facet from prior transient lateral patellar dislocation.     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  Patient was transition to a lateral J brace with activity at last visit. He allowed return to work but was advised to avoid shifting, sidestepping, pivoting, kneeling, squatting, etc., and to continue progressing range of motion with PT. He returned to work late last week and has been using breaks to rest every 2 hours as instructed.      Today patient reports gradual improvement with the knee.  Physical therapy is very beneficial and his mobility is improving.  He gets occasional swelling with the knee.  The most challenging aspect of returning to work has been all the seated work that he is doing.  He makes it a point to get up and stretch the knee walk around etc.  He does get occasional medial joint space discomfort that is not constant and is well-managed with the diclofenac that was prescribed for his shoulder condition with Dr. Vargas at a recent visit.  He has only been back to work on  "light duty for 1-1/2 weeks and would like to continue the current restrictions if possible/recommended.  Denies any further dislocations of the kneecap.    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      Allergies   Allergen Reactions    Penicillins Hives        Social History     Socioeconomic History    Marital status: Single   Tobacco Use    Smoking status: Former     Current packs/day: 0.00     Average packs/day: 1 pack/day for 12.0 years (12.0 ttl pk-yrs)     Types: Cigarettes     Start date: 5/10/2009     Quit date: 5/10/2021     Years since quittin.1     Passive exposure: Past    Smokeless tobacco: Never   Vaping Use    Vaping status: Never Used   Substance and Sexual Activity    Alcohol use: Never    Drug use: Never        Tobacco Use: Medium Risk (2025)    Patient History     Smoking Tobacco Use: Former     Smokeless Tobacco Use: Never     Passive Exposure: Past     Patient reports they have a history of tobacco use; encouraged continued tobacco cessation for further health benefits.     I reviewed the patient's chief complaint, history of present illness, review of systems, past medical history, surgical history, family history, social history, medications, and allergy list.     Review of Systems     Constitutional: Denies fevers, chills, weight loss  Cardiovascular: Denies chest pain, shortness of breath  Skin: Denies rashes, acute skin changes  Neurologic: Denies headache, loss of consciousness    Vital Signs:   /84   Pulse 62   Ht 177.8 cm (70\")   Wt 117 kg (257 lb)   SpO2 96%   BMI 36.88 kg/m²          Physical Exam  General: Alert. No acute distress    Ortho Exam    General: Alert, no acute distress  Right lower extremity: Trace to none knee effusion.  No tenderness to the medial joint line.  No tenderness to the lateral joint line.  125 degrees of flexion.  0 degrees extension. Knee extensor mechanism intact. Knee stable to varus and valgus stress. Calf " soft, nontender. Demonstrates active ankle plantarflexion and dorsiflexion. Sensation intact over the dorsal and plantar foot.  Palpable pedal pulses.          Physical Exam  Musculoskeletal:  Right knee: Improved range of motion, no tenderness on palpation, mild swelling noted above the kneecap and below the kneecap, no instability of the patella.          Assessment and Plan     Diagnoses and all orders for this visit:    1. Patellar dislocation, right, subsequent encounter (Primary)  -     Ambulatory Referral to Physical Therapy for Evaluation & Treatment    2. Posterior horn medial meniscus tear, and partial medial patellofemoral ligament tear  -     Ambulatory Referral to Physical Therapy for Evaluation & Treatment      Progressing well in comparison to last visit.    Continue PT.  Order updated.    Will continue current work restrictions with lateral J brace with activity.  He will avoid shifting sidestepping pivoting kneeling squatting etc.  Breaks as needed ice and elevate.  Diclofenac he will continue as it is well managing his discomfort.  If the medial joint space discomfort worsens we can consider alternative treatment.    I will plan to see him back in 1 month since this is Workmen's Comp. to re-evaluate his current work restrictions  Assessment & Plan      Patient or patient representative verbalized consent for the use of Ambient Listening during the visit with  Ashly Davis PA-C for chart documentation. 7/11/2025  14:07 EDT    Follow Up   There are no Patient Instructions on file for this visit.        Patient was given instructions and counseling regarding his condition or for health maintenance advice. Please see specific information pulled into the AVS if appropriate.     Ashly Davis PA-C   07/11/2025  21:10 EDT        Dictated Utilizing Dragon Dictation. Please note that portions of this note were completed with a voice recognition program. Part of this note may be an electronic  transcription/translation of spoken language to printed text using the Dragon Dictation System.

## 2025-07-16 ENCOUNTER — TREATMENT (OUTPATIENT)
Dept: PHYSICAL THERAPY | Facility: CLINIC | Age: 35
End: 2025-07-16
Payer: OTHER MISCELLANEOUS

## 2025-07-16 DIAGNOSIS — M25.561 ACUTE PAIN OF RIGHT KNEE: ICD-10-CM

## 2025-07-16 DIAGNOSIS — R26.9 GAIT DISTURBANCE: ICD-10-CM

## 2025-07-16 DIAGNOSIS — M25.661 DECREASED RANGE OF MOTION (ROM) OF RIGHT KNEE: ICD-10-CM

## 2025-07-16 DIAGNOSIS — R29.898 WEAKNESS OF RIGHT LEG: ICD-10-CM

## 2025-07-16 DIAGNOSIS — S83.004A PATELLAR DISLOCATION, RIGHT, INITIAL ENCOUNTER: Primary | ICD-10-CM

## 2025-07-16 NOTE — PROGRESS NOTES
Physical Therapy Daily Treatment Note / Reassessment  [address], [address]  [place] [address]  Phone: [phone]      Fax: [phone]  Patient: Yves Cordova   : 1990  Diagnosis/ICD-10 Code:  Patellar dislocation, right, initial encounter [S83.004A]  Referring practitioner: Dameon Vargas MD  Date of Initial Visit: 2025  Today's Date: 2025  Patient seen for 12 sessions         Visit Diagnoses:    ICD-10-CM ICD-9-CM   1. Patellar dislocation, right, initial encounter  S83.004A 836.3   2. Acute pain of right knee  M25.561 719.46   3. Decreased range of motion (ROM) of right knee  M25.661 719.56   4. Weakness of right leg  R29.898 729.89   5. Gait disturbance  R26.9 781.2       Subjective Questionnaire: LEFS: 43/80  Clinical Progress: improved  Home Program Compliance: Yes  Treatment has included: therapeutic exercise, neuromuscular re-education, manual therapy, and therapeutic activity      Subjective:  Yves Cordova reports: he has been able to bend his R knee further than before on his own. He states he feels like the brace is now hindering his motion and activity. He only wears the brace when he is working or doing his exercises. He has noticed an increase in swelling since returning to work, but attributes it to getting back into things and the swelling does not last long. Will continue with work restrictions for another month.     Pre tx pn score: 0  Post tx pn score: 1      Objective          Active Range of Motion     Right Knee   Flexion: 112 degrees   Extensor la degrees     Strength/Myotome Testing     Right Hip   Planes of Motion   Flexion: 4-  Extension: 4  Abduction: 4+  Adduction: 4-    Right Knee   Flexion: 4+  Extension: 4        Treatment  See Exercise, Manual, and Modality Logs for complete treatment.       Assessment:  Reassessment performed. Pt demos improved R knee AROM in flexion and gradual improvements R knee/hip strength. Pt is progressing well in PT.  Continues to demo impaired R knee stability secondary to remaining strength deficits and pt uneasiness. Pt would benefit from continued skilled therapy services to improve remaining deficits, decrease pain and achieve personal and work related goals.     Progress toward previous goals: Partially Met    Goals  Plan Goals: KNEE PROBLEMS:      1. The patient has limited ROM of the right knee.              LTG 1: 12 weeks:  The patient will demonstrate 0 to 120 degrees of ROM for the right knee in order to allow patient to perform ADLs and navigate stairs.                          STATUS:  Progressing              STG 1a: 4 weeks:  The patient will demonstrate 0 to 90 degrees of ROM for the right knee.                          STATUS:  MET              TREATMENT: Manual therapy, therapeutic exercise, home exercise instruction, and modalities as needed to include:  moist heat, electrical stimulation, ultrasound, and ice.     2. The patient has limited strength of the right knee.              LTG 2: 12 weeks: The patient will demonstrate 4+ /5 strength for right knee flexion and extension in order to allow patient improved joint stability                          STATUS:  Progressing              STG 2a: 4 weeks: The patient will demonstrate 4 /5 strength for right knee flexion and extension                          STATUS:  MET  STG2b:  4 weeks:  The patient will be independent with home exercises.                           STATUS:  Progressing              TREATMENT: Manual therapy, therapeutic exercise, home exercise instruction, aquatic therapy, and modalities as needed to include:  moist heat, electrical stimulation, ultrasound, and ice.                3. The patient has gait dysfunction.              LTG 3: 12 weeks:  The patient will ambulate without assistive device, independently, for community distances with minimal limp to the right lower extremity in order to improve mobility and allow patient to perform  activities such as grocery shopping with greater ease.                          STATUS:  Progressing              TREATMENT: Gait training, aquatic therapy, therapeutic exercise, and home exercise instruction.     4. Mobility: Walking/Moving Around Functional Limitation                               LTG 4: 12 weeks:  The patient will demonstrate decreased limitation by achieving a score of 50/80 on the LEFS.                          STATUS:  Progressing              STG 4 a: 4 weeks:  The patient will demonstrate decreased limitation by achieving a score of 30/80 on the LEFS.                            STATUS:  MET              TREATMENT:  Manual therapy, therapeutic exercise, home exercise instruction, and modalities as needed.    Plan:  Continue with PT POC as outlined in chart.        Timed:         Manual Therapy:    0     mins  26568;     Therapeutic Exercise:    24     mins  66348;     Neuromuscular Lissette:    15    mins  49549;    Therapeutic Activity:     10     mins  34884;     Gait Trainin     mins  24606;     Ultrasound:     0     mins  54242;    Ionto                               0    mins   71806  Self Care                       0     mins   63193    Un-Timed:  Electrical Stimulation:    0     mins  47939 (MC );  Dry Needling     0     mins self-pay  Traction     0     mins 43657  Canalith Repos    0     mins 13081  Re-Eval                           0    mins  83831      Timed Treatment:   49   mins   Total Treatment:     49   mins          Mica Martel PT  Physical Therapist  [place] License # [id number]    Electronically signed by Mica Martel PT, 25, 2:49 PM EDT

## 2025-07-21 ENCOUNTER — TREATMENT (OUTPATIENT)
Dept: PHYSICAL THERAPY | Facility: CLINIC | Age: 35
End: 2025-07-21
Payer: OTHER MISCELLANEOUS

## 2025-07-21 DIAGNOSIS — M25.561 ACUTE PAIN OF RIGHT KNEE: ICD-10-CM

## 2025-07-21 DIAGNOSIS — M25.661 DECREASED RANGE OF MOTION (ROM) OF RIGHT KNEE: ICD-10-CM

## 2025-07-21 DIAGNOSIS — R29.898 WEAKNESS OF RIGHT LEG: ICD-10-CM

## 2025-07-21 DIAGNOSIS — R26.9 GAIT DISTURBANCE: ICD-10-CM

## 2025-07-21 DIAGNOSIS — S83.004A PATELLAR DISLOCATION, RIGHT, INITIAL ENCOUNTER: Primary | ICD-10-CM

## 2025-07-21 PROCEDURE — 97110 THERAPEUTIC EXERCISES: CPT | Performed by: PHYSICAL THERAPIST

## 2025-07-21 PROCEDURE — 97530 THERAPEUTIC ACTIVITIES: CPT | Performed by: PHYSICAL THERAPIST

## 2025-07-21 PROCEDURE — 97112 NEUROMUSCULAR REEDUCATION: CPT | Performed by: PHYSICAL THERAPIST

## 2025-07-21 NOTE — PROGRESS NOTES
Outpatient Physical Therapy                   Physical Therapy Daily Treatment Note    Patient: Yves Cordova   : 1990  Diagnosis/ICD-10 Code:  Patellar dislocation, right, initial encounter [S83.004A]  Referring practitioner: Dameon Vargas MD  Date of Initial Visit: Type: THERAPY  Noted: 2025  Today's Date: 2025  Patient seen for 13 sessions             Subjective   Yves Cordova reports: his pain does get down to 0/10 pain at times but his knee stays about 2/10 pain with increased activity or after work.     Pain: 2/10 pain, at time of arrival.     Objective     See Exercise, Manual, and Modality Logs for complete treatment.     Assessment/Plan  Yves demonstrated good tolerance to all functional lower strengthening. Pt did complain of medial knee pain with tandem stance with ball toss especially when the right LE was in front. Exercise reps were reduced today due to pain. Continue to progress with current PT plan of care per patient tolerance.     Progress per Plan of Care      Timed:  Manual Therapy:    0     mins  76112;  Therapeutic Exercise:    16     mins  90063;  Neuromuscular Lissette:    10    mins  58220;  Therapeutic Activity:     8     mins  14539;  Self care:                       0     mins  63388;  Gait Trainin     mins  95723;  Ultrasound:                    0     mins  94624;    Untimed:  Mechanical Traction:    0     mins  71651;  Electrical Stimulation:    0     mins  23882 ( );      Timed Treatment:   34   mins  Total Treatment:     34   mins      Electronically signed:   Binta Mcwilliams PTA  Physical Therapist Assistant  [facility] License #: [id number]

## 2025-07-23 ENCOUNTER — TREATMENT (OUTPATIENT)
Dept: PHYSICAL THERAPY | Facility: CLINIC | Age: 35
End: 2025-07-23
Payer: OTHER MISCELLANEOUS

## 2025-07-23 DIAGNOSIS — R26.9 GAIT DISTURBANCE: ICD-10-CM

## 2025-07-23 DIAGNOSIS — S83.004A PATELLAR DISLOCATION, RIGHT, INITIAL ENCOUNTER: Primary | ICD-10-CM

## 2025-07-23 DIAGNOSIS — R29.898 WEAKNESS OF RIGHT LEG: ICD-10-CM

## 2025-07-23 DIAGNOSIS — M25.661 DECREASED RANGE OF MOTION (ROM) OF RIGHT KNEE: ICD-10-CM

## 2025-07-23 DIAGNOSIS — M25.561 ACUTE PAIN OF RIGHT KNEE: ICD-10-CM

## 2025-07-23 NOTE — PROGRESS NOTES
Outpatient Physical Therapy                   Physical Therapy Daily Treatment Note    Patient: Yves Cordova   : 1990  Diagnosis/ICD-10 Code:  Patellar dislocation, right, initial encounter [S83.004A]  Referring practitioner: Dameon Vargas MD  Date of Initial Visit: Type: THERAPY  Noted: 2025  Today's Date: 2025  Patient seen for 14 sessions             Subjective   Yves Cordova reports: his pain was 2/10 pain at its worst today; this was while at work. Pt states he is taking a break 1-2x a day as needed for his knee. At most the break lasts 30 minutes but most of the time he doesn't need more than 15 minutes each.     Pain: 1/10 pain, at time of arrival.     Objective     See Exercise, Manual, and Modality Logs for complete treatment.     Assessment/Plan  Yves demonstrated good tolerance to all functional lower strengthening. Continue to progress with current PT plan of care per patient tolerance.       Progress per Plan of Care      Timed:  Manual Therapy:    0     mins  63858;  Therapeutic Exercise:    10     mins  44327;  Neuromuscular Lissette:    8    mins  59705;  Therapeutic Activity:     10     mins  00987;  Self care:                       0     mins  41582;  Gait Trainin     mins  04053;  Ultrasound:                    0     mins  86076;    Untimed:  Mechanical Traction:    0     mins  23039;  Electrical Stimulation:    0     mins  36255 ( );      Timed Treatment:   28   mins  Total Treatment:     28   mins      Electronically signed:   Binta Mcwilliams PTA  Physical Therapist Assistant  [facility] License #: [id number]

## 2025-07-28 ENCOUNTER — TREATMENT (OUTPATIENT)
Dept: PHYSICAL THERAPY | Facility: CLINIC | Age: 35
End: 2025-07-28
Payer: OTHER MISCELLANEOUS

## 2025-07-28 DIAGNOSIS — M25.561 ACUTE PAIN OF RIGHT KNEE: ICD-10-CM

## 2025-07-28 DIAGNOSIS — R26.9 GAIT DISTURBANCE: ICD-10-CM

## 2025-07-28 DIAGNOSIS — M25.661 DECREASED RANGE OF MOTION (ROM) OF RIGHT KNEE: ICD-10-CM

## 2025-07-28 DIAGNOSIS — R29.898 WEAKNESS OF RIGHT LEG: ICD-10-CM

## 2025-07-28 DIAGNOSIS — S83.004A PATELLAR DISLOCATION, RIGHT, INITIAL ENCOUNTER: Primary | ICD-10-CM

## 2025-07-28 PROCEDURE — 97112 NEUROMUSCULAR REEDUCATION: CPT | Performed by: PHYSICAL THERAPIST

## 2025-07-28 PROCEDURE — 97530 THERAPEUTIC ACTIVITIES: CPT | Performed by: PHYSICAL THERAPIST

## 2025-07-28 PROCEDURE — 97110 THERAPEUTIC EXERCISES: CPT | Performed by: PHYSICAL THERAPIST

## 2025-07-28 NOTE — PROGRESS NOTES
Outpatient Physical Therapy                   Physical Therapy Daily Treatment Note    Patient: Yves Cordova   : 1990  Diagnosis/ICD-10 Code:  Patellar dislocation, right, initial encounter [S83.004A]  Referring practitioner: Dameon Vargas MD  Date of Initial Visit: Type: THERAPY  Noted: 2025  Today's Date: 2025  Patient seen for 15 sessions             Subjective   Yves Cordova reports: he has had no pain today; pt states it was an easy day at work. He didn't have to take any extra breaks today due to his knee.     Pain: 0/10 pain, at time of arrival.     Objective     See Exercise, Manual, and Modality Logs for complete treatment.     Assessment/Plan  Yves is progressing as he didn't have pain at work today; although this may be from the amount of work he had to do. He states most days it seems to be the brace pushing into his knee that is hurting. Yves was able to do the leg press today with good tolerance; just fatigue after.     Progress per Plan of Care      Timed:  Manual Therapy:    0     mins  52767;  Therapeutic Exercise:    10     mins  45832;  Neuromuscular Lissette:    8    mins  98787;  Therapeutic Activity:     9     mins  10357;  Self care:                       0     mins  22705;  Gait Trainin     mins  23839;  Ultrasound:                    0     mins  51627;    Untimed:  Mechanical Traction:    0     mins  83608;  Electrical Stimulation:    0     mins  87551 ( );      Timed Treatment:   27   mins  Total Treatment:     27   mins      Electronically signed:   Binta Mcwilliams PTA  Physical Therapist Assistant  \[facility]\"" License #: [id number]

## 2025-07-30 ENCOUNTER — TREATMENT (OUTPATIENT)
Dept: PHYSICAL THERAPY | Facility: CLINIC | Age: 35
End: 2025-07-30
Payer: OTHER MISCELLANEOUS

## 2025-07-30 DIAGNOSIS — M25.561 ACUTE PAIN OF RIGHT KNEE: ICD-10-CM

## 2025-07-30 DIAGNOSIS — S83.004A PATELLAR DISLOCATION, RIGHT, INITIAL ENCOUNTER: Primary | ICD-10-CM

## 2025-07-30 DIAGNOSIS — M25.661 DECREASED RANGE OF MOTION (ROM) OF RIGHT KNEE: ICD-10-CM

## 2025-07-30 DIAGNOSIS — R29.898 WEAKNESS OF RIGHT LEG: ICD-10-CM

## 2025-07-30 DIAGNOSIS — R26.9 GAIT DISTURBANCE: ICD-10-CM

## 2025-07-30 NOTE — PROGRESS NOTES
Outpatient Physical Therapy                   Physical Therapy Daily Treatment Note    Patient: Yves Cordova   : 1990  Diagnosis/ICD-10 Code:  Patellar dislocation, right, initial encounter [S83.004A]  Referring practitioner: Damoen Vargas MD  Date of Initial Visit: Type: THERAPY  Noted: 2025  Today's Date: 2025  Patient seen for 16 sessions             Subjective   Yves Cordova reports: he is still sore from the leg press that was added last session. Pt notes that going down stairs or a hill is still very difficult.     Pain: 0/10 pain, at time of arrival. Pt reports no pain at work today either.     Objective     See Exercise, Manual, and Modality Logs for complete treatment.     Assessment/Plan  Pt is progressing as evident by decreased pain during and after work. Yves demonstrated good tolerance to all functional lower strengthening. Pt was very unstable with tandem stance on the foam beam and required UE support at least once during the 30 second attempts. Pt reports he has been practicing at home as he feels this is his weakest point. Continue to progress with current PT plan of care per patient tolerance.       Progress per Plan of Care      Timed:  Manual Therapy:    0     mins  10647;  Therapeutic Exercise:    13     mins  92710;  Neuromuscular Lissette:    10    mins  26616;  Therapeutic Activity:     11     mins  82921;  Self care:                       0     mins  86851;  Gait Trainin     mins  82775;  Ultrasound:                    0     mins  37456;    Untimed:  Mechanical Traction:    0     mins  96113;  Electrical Stimulation:    0     mins  39600 ( );      Timed Treatment:   34   mins  Total Treatment:     34   mins      Electronically signed:   Binta Mcwilliams PTA  Physical Therapist Assistant  [facility] License #: [id number]

## 2025-08-04 ENCOUNTER — TREATMENT (OUTPATIENT)
Dept: PHYSICAL THERAPY | Facility: CLINIC | Age: 35
End: 2025-08-04
Payer: OTHER MISCELLANEOUS

## 2025-08-04 DIAGNOSIS — M25.661 DECREASED RANGE OF MOTION (ROM) OF RIGHT KNEE: ICD-10-CM

## 2025-08-04 DIAGNOSIS — R26.9 GAIT DISTURBANCE: ICD-10-CM

## 2025-08-04 DIAGNOSIS — M25.561 ACUTE PAIN OF RIGHT KNEE: ICD-10-CM

## 2025-08-04 DIAGNOSIS — S83.004A PATELLAR DISLOCATION, RIGHT, INITIAL ENCOUNTER: Primary | ICD-10-CM

## 2025-08-04 DIAGNOSIS — R29.898 WEAKNESS OF RIGHT LEG: ICD-10-CM

## 2025-08-04 PROCEDURE — 97112 NEUROMUSCULAR REEDUCATION: CPT | Performed by: PHYSICAL THERAPIST

## 2025-08-04 PROCEDURE — 97530 THERAPEUTIC ACTIVITIES: CPT | Performed by: PHYSICAL THERAPIST

## 2025-08-04 PROCEDURE — 97110 THERAPEUTIC EXERCISES: CPT | Performed by: PHYSICAL THERAPIST

## 2025-08-06 ENCOUNTER — ANESTHESIA EVENT (OUTPATIENT)
Dept: GASTROENTEROLOGY | Facility: HOSPITAL | Age: 35
End: 2025-08-06
Payer: COMMERCIAL

## 2025-08-06 ENCOUNTER — TREATMENT (OUTPATIENT)
Dept: PHYSICAL THERAPY | Facility: CLINIC | Age: 35
End: 2025-08-06
Payer: COMMERCIAL

## 2025-08-06 DIAGNOSIS — S83.004A PATELLAR DISLOCATION, RIGHT, INITIAL ENCOUNTER: Primary | ICD-10-CM

## 2025-08-06 DIAGNOSIS — R26.9 GAIT DISTURBANCE: ICD-10-CM

## 2025-08-06 DIAGNOSIS — M25.561 ACUTE PAIN OF RIGHT KNEE: ICD-10-CM

## 2025-08-06 DIAGNOSIS — M25.661 DECREASED RANGE OF MOTION (ROM) OF RIGHT KNEE: ICD-10-CM

## 2025-08-06 DIAGNOSIS — R29.898 WEAKNESS OF RIGHT LEG: ICD-10-CM

## 2025-08-07 ENCOUNTER — HOSPITAL ENCOUNTER (OUTPATIENT)
Facility: HOSPITAL | Age: 35
Setting detail: HOSPITAL OUTPATIENT SURGERY
Discharge: HOME | End: 2025-08-07
Attending: INTERNAL MEDICINE | Admitting: INTERNAL MEDICINE
Payer: COMMERCIAL

## 2025-08-07 ENCOUNTER — ANESTHESIA (OUTPATIENT)
Dept: GASTROENTEROLOGY | Facility: HOSPITAL | Age: 35
End: 2025-08-07
Payer: COMMERCIAL

## 2025-08-07 VITALS
OXYGEN SATURATION: 95 % | RESPIRATION RATE: 12 BRPM | HEART RATE: 53 BPM | HEIGHT: 70 IN | TEMPERATURE: 97.2 F | WEIGHT: 255.51 LBS | DIASTOLIC BLOOD PRESSURE: 84 MMHG | BODY MASS INDEX: 36.58 KG/M2 | SYSTOLIC BLOOD PRESSURE: 105 MMHG

## 2025-08-07 DIAGNOSIS — K92.1 BLOOD IN STOOL: ICD-10-CM

## 2025-08-07 PROCEDURE — 45385 COLONOSCOPY W/LESION REMOVAL: CPT | Performed by: INTERNAL MEDICINE

## 2025-08-07 PROCEDURE — 25010000002 PROPOFOL 10 MG/ML EMULSION: Performed by: NURSE ANESTHETIST, CERTIFIED REGISTERED

## 2025-08-07 PROCEDURE — 25810000003 LACTATED RINGERS PER 1000 ML: Performed by: NURSE ANESTHETIST, CERTIFIED REGISTERED

## 2025-08-07 PROCEDURE — 88305 TISSUE EXAM BY PATHOLOGIST: CPT | Performed by: INTERNAL MEDICINE

## 2025-08-07 PROCEDURE — 45380 COLONOSCOPY AND BIOPSY: CPT | Performed by: INTERNAL MEDICINE

## 2025-08-07 PROCEDURE — 25010000002 LIDOCAINE PF 2% 2 % SOLUTION: Performed by: NURSE ANESTHETIST, CERTIFIED REGISTERED

## 2025-08-07 RX ORDER — LIDOCAINE HYDROCHLORIDE 20 MG/ML
INJECTION, SOLUTION EPIDURAL; INFILTRATION; INTRACAUDAL; PERINEURAL AS NEEDED
Status: DISCONTINUED | OUTPATIENT
Start: 2025-08-07 | End: 2025-08-07 | Stop reason: SURG

## 2025-08-07 RX ORDER — SODIUM CHLORIDE, SODIUM LACTATE, POTASSIUM CHLORIDE, CALCIUM CHLORIDE 600; 310; 30; 20 MG/100ML; MG/100ML; MG/100ML; MG/100ML
30 INJECTION, SOLUTION INTRAVENOUS CONTINUOUS
Status: DISCONTINUED | OUTPATIENT
Start: 2025-08-07 | End: 2025-08-07 | Stop reason: HOSPADM

## 2025-08-07 RX ADMIN — PROPOFOL 250 MCG/KG/MIN: 10 INJECTION, EMULSION INTRAVENOUS at 11:06

## 2025-08-07 RX ADMIN — LIDOCAINE HYDROCHLORIDE 100 MG: 20 INJECTION, SOLUTION EPIDURAL; INFILTRATION; INTRACAUDAL; PERINEURAL at 11:06

## 2025-08-07 RX ADMIN — SODIUM CHLORIDE, POTASSIUM CHLORIDE, SODIUM LACTATE AND CALCIUM CHLORIDE: 600; 310; 30; 20 INJECTION, SOLUTION INTRAVENOUS at 11:04

## 2025-08-07 RX ADMIN — METHOHEXITAL SODIUM 50 MG: 500 INJECTION, POWDER, LYOPHILIZED, FOR SOLUTION INTRAMUSCULAR; INTRAVENOUS; RECTAL at 11:06

## 2025-08-08 ENCOUNTER — RESULTS FOLLOW-UP (OUTPATIENT)
Dept: GASTROENTEROLOGY | Facility: HOSPITAL | Age: 35
End: 2025-08-08
Payer: COMMERCIAL

## 2025-08-08 LAB
CYTO UR: NORMAL
LAB AP CASE REPORT: NORMAL
LAB AP CLINICAL INFORMATION: NORMAL
PATH REPORT.FINAL DX SPEC: NORMAL
PATH REPORT.GROSS SPEC: NORMAL

## 2025-08-11 ENCOUNTER — OFFICE VISIT (OUTPATIENT)
Dept: ORTHOPEDIC SURGERY | Facility: CLINIC | Age: 35
End: 2025-08-11
Payer: OTHER MISCELLANEOUS

## 2025-08-11 VITALS
HEART RATE: 58 BPM | WEIGHT: 255 LBS | BODY MASS INDEX: 36.51 KG/M2 | HEIGHT: 70 IN | OXYGEN SATURATION: 96 % | DIASTOLIC BLOOD PRESSURE: 85 MMHG | SYSTOLIC BLOOD PRESSURE: 134 MMHG

## 2025-08-11 DIAGNOSIS — S83.004D PATELLAR DISLOCATION, RIGHT, SUBSEQUENT ENCOUNTER: ICD-10-CM

## 2025-08-11 DIAGNOSIS — M23.321 DERANGEMENT OF POSTERIOR HORN OF MEDIAL MENISCUS OF RIGHT KNEE: Primary | ICD-10-CM

## 2025-08-11 DIAGNOSIS — S83.419A PARTIAL TEAR OF MEDIAL COLLATERAL LIGAMENT OF KNEE: ICD-10-CM

## 2025-08-11 PROCEDURE — 99213 OFFICE O/P EST LOW 20 MIN: CPT | Performed by: PHYSICIAN ASSISTANT

## 2025-08-13 ENCOUNTER — TREATMENT (OUTPATIENT)
Dept: PHYSICAL THERAPY | Facility: CLINIC | Age: 35
End: 2025-08-13
Payer: OTHER MISCELLANEOUS

## 2025-08-13 DIAGNOSIS — S83.004A PATELLAR DISLOCATION, RIGHT, INITIAL ENCOUNTER: Primary | ICD-10-CM

## 2025-08-13 DIAGNOSIS — R29.898 WEAKNESS OF RIGHT LEG: ICD-10-CM

## 2025-08-13 DIAGNOSIS — R26.9 GAIT DISTURBANCE: ICD-10-CM

## 2025-08-13 DIAGNOSIS — M25.561 ACUTE PAIN OF RIGHT KNEE: ICD-10-CM

## 2025-08-13 DIAGNOSIS — M25.661 DECREASED RANGE OF MOTION (ROM) OF RIGHT KNEE: ICD-10-CM

## 2025-08-27 ENCOUNTER — TREATMENT (OUTPATIENT)
Dept: PHYSICAL THERAPY | Facility: CLINIC | Age: 35
End: 2025-08-27
Payer: OTHER MISCELLANEOUS

## 2025-08-27 DIAGNOSIS — R29.898 WEAKNESS OF RIGHT LEG: ICD-10-CM

## 2025-08-27 DIAGNOSIS — M25.661 DECREASED RANGE OF MOTION (ROM) OF RIGHT KNEE: ICD-10-CM

## 2025-08-27 DIAGNOSIS — M25.561 ACUTE PAIN OF RIGHT KNEE: ICD-10-CM

## 2025-08-27 DIAGNOSIS — S83.004A PATELLAR DISLOCATION, RIGHT, INITIAL ENCOUNTER: Primary | ICD-10-CM

## 2025-08-27 DIAGNOSIS — R26.9 GAIT DISTURBANCE: ICD-10-CM

## (undated) DEVICE — DEFENDO AIR WATER SUCTION AND BIOPSY VALVE KIT: Brand: DEFENDO AIR/WATER/SUCTION AND BIOPSY VALVE

## (undated) DEVICE — THE SINGLE USE ETRAP – POLYP TRAP IS USED FOR SUCTION RETRIEVAL OF ENDOSCOPICALLY REMOVED POLYPS.: Brand: ETRAP

## (undated) DEVICE — SNAR POLYP CAPTIFLEX XS/OVL 11X2.4MM 240CM 1P/U

## (undated) DEVICE — CONN JET HYDRA H20 AUXILIARY DISP

## (undated) DEVICE — LINER SURG CANSTR SXN S/RIGD 1500CC

## (undated) DEVICE — THE STERILE LIGHT HANDLE COVER IS USED WITH STERIS SURGICAL LIGHTING AND VISUALIZATION SYSTEMS.

## (undated) DEVICE — SOLIDIFIER LIQLOC PLS 1500CC BT

## (undated) DEVICE — SOL IRRG H2O PL/BG 1000ML STRL

## (undated) DEVICE — Device

## (undated) DEVICE — SINGLE-USE BIOPSY FORCEPS: Brand: RADIAL JAW 4